# Patient Record
Sex: MALE | Race: BLACK OR AFRICAN AMERICAN | NOT HISPANIC OR LATINO | Employment: OTHER | URBAN - METROPOLITAN AREA
[De-identification: names, ages, dates, MRNs, and addresses within clinical notes are randomized per-mention and may not be internally consistent; named-entity substitution may affect disease eponyms.]

---

## 2021-02-01 ENCOUNTER — IMMUNIZATION (OUTPATIENT)
Dept: VACCINE CLINIC | Facility: HOSPITAL | Age: 81
End: 2021-02-01

## 2021-02-01 PROCEDURE — 0001A: CPT | Performed by: INTERNAL MEDICINE

## 2021-02-01 PROCEDURE — 91300 HC SARSCOV02 VAC 30MCG/0.3ML IM: CPT | Performed by: INTERNAL MEDICINE

## 2021-02-22 ENCOUNTER — IMMUNIZATION (OUTPATIENT)
Dept: VACCINE CLINIC | Facility: HOSPITAL | Age: 81
End: 2021-02-22

## 2021-02-22 PROCEDURE — 0002A: CPT | Performed by: INTERNAL MEDICINE

## 2021-02-22 PROCEDURE — 91300 HC SARSCOV02 VAC 30MCG/0.3ML IM: CPT | Performed by: INTERNAL MEDICINE

## 2022-03-21 ENCOUNTER — TELEPHONE (OUTPATIENT)
Dept: FAMILY MEDICINE CLINIC | Facility: CLINIC | Age: 82
End: 2022-03-21

## 2022-03-23 RX ORDER — SIMVASTATIN 40 MG
TABLET ORAL
Qty: 30 TABLET | Refills: 0 | Status: SHIPPED | OUTPATIENT
Start: 2022-03-23 | End: 2022-04-26

## 2022-03-23 RX ORDER — TAMSULOSIN HYDROCHLORIDE 0.4 MG/1
CAPSULE ORAL
Qty: 90 CAPSULE | Refills: 0 | Status: SHIPPED | OUTPATIENT
Start: 2022-03-23 | End: 2022-07-15 | Stop reason: SDUPTHER

## 2022-04-26 RX ORDER — SIMVASTATIN 40 MG
TABLET ORAL
Qty: 90 TABLET | Refills: 1 | Status: SHIPPED | OUTPATIENT
Start: 2022-04-26 | End: 2022-07-15 | Stop reason: SDUPTHER

## 2022-04-26 NOTE — TELEPHONE ENCOUNTER
Called patient and got him scheduled for an annual with dr. Malone. Was last billed for one in [employer] on 07/13/21 so we should be fine with that date. Sent in medication to get him to appointment

## 2022-05-25 RX ORDER — PANTOPRAZOLE SODIUM 40 MG/1
TABLET, DELAYED RELEASE ORAL
Qty: 30 TABLET | Refills: 2 | Status: SHIPPED | OUTPATIENT
Start: 2022-05-25 | End: 2022-07-15 | Stop reason: SDUPTHER

## 2022-06-09 RX ORDER — POTASSIUM CHLORIDE 1500 MG/1
TABLET, FILM COATED, EXTENDED RELEASE ORAL
Qty: 30 TABLET | Refills: 0 | Status: SHIPPED | OUTPATIENT
Start: 2022-06-09 | End: 2022-07-11

## 2022-06-16 RX ORDER — OXYBUTYNIN CHLORIDE 10 MG/1
TABLET, EXTENDED RELEASE ORAL
Qty: 90 TABLET | Refills: 0 | Status: SHIPPED | OUTPATIENT
Start: 2022-06-16 | End: 2022-07-15 | Stop reason: SDUPTHER

## 2022-06-20 DIAGNOSIS — E11.43 TYPE 2 DIABETES MELLITUS WITH DIABETIC AUTONOMIC NEUROPATHY, WITHOUT LONG-TERM CURRENT USE OF INSULIN: Primary | ICD-10-CM

## 2022-06-21 RX ORDER — GABAPENTIN 100 MG/1
CAPSULE ORAL
Qty: 45 CAPSULE | Refills: 0 | Status: SHIPPED | OUTPATIENT
Start: 2022-06-21 | End: 2022-07-15 | Stop reason: SDUPTHER

## 2022-07-11 RX ORDER — BUMETANIDE 1 MG/1
TABLET ORAL
Qty: 30 TABLET | Refills: 0 | Status: SHIPPED | OUTPATIENT
Start: 2022-07-11 | End: 2022-07-15 | Stop reason: SDUPTHER

## 2022-07-11 RX ORDER — POTASSIUM CHLORIDE 1500 MG/1
TABLET, FILM COATED, EXTENDED RELEASE ORAL
Qty: 30 TABLET | Refills: 0 | Status: SHIPPED | OUTPATIENT
Start: 2022-07-11 | End: 2022-07-15 | Stop reason: SDUPTHER

## 2022-07-15 ENCOUNTER — OFFICE VISIT (OUTPATIENT)
Dept: FAMILY MEDICINE CLINIC | Facility: CLINIC | Age: 82
End: 2022-07-15

## 2022-07-15 VITALS
WEIGHT: 173 LBS | OXYGEN SATURATION: 98 % | DIASTOLIC BLOOD PRESSURE: 80 MMHG | HEART RATE: 67 BPM | SYSTOLIC BLOOD PRESSURE: 160 MMHG

## 2022-07-15 DIAGNOSIS — F39 MOOD DISORDER: ICD-10-CM

## 2022-07-15 DIAGNOSIS — Z00.00 ROUTINE GENERAL MEDICAL EXAMINATION AT A HEALTH CARE FACILITY: Primary | ICD-10-CM

## 2022-07-15 DIAGNOSIS — E11.43 TYPE 2 DIABETES MELLITUS WITH DIABETIC AUTONOMIC NEUROPATHY, WITHOUT LONG-TERM CURRENT USE OF INSULIN: ICD-10-CM

## 2022-07-15 DIAGNOSIS — N32.81 OVERACTIVE BLADDER: ICD-10-CM

## 2022-07-15 DIAGNOSIS — I10 PRIMARY HYPERTENSION: ICD-10-CM

## 2022-07-15 DIAGNOSIS — E78.2 MIXED HYPERLIPIDEMIA: ICD-10-CM

## 2022-07-15 DIAGNOSIS — N40.1 LOWER URINARY TRACT SYMPTOMS DUE TO BENIGN PROSTATIC HYPERPLASIA: ICD-10-CM

## 2022-07-15 PROCEDURE — 36415 COLL VENOUS BLD VENIPUNCTURE: CPT | Performed by: FAMILY MEDICINE

## 2022-07-15 PROCEDURE — 99397 PER PM REEVAL EST PAT 65+ YR: CPT | Performed by: FAMILY MEDICINE

## 2022-07-15 RX ORDER — POTASSIUM CHLORIDE 1500 MG/1
20 TABLET, FILM COATED, EXTENDED RELEASE ORAL DAILY
Qty: 90 TABLET | Refills: 1 | Status: SHIPPED | OUTPATIENT
Start: 2022-07-15 | End: 2023-01-17

## 2022-07-15 RX ORDER — OXYBUTYNIN CHLORIDE 10 MG/1
10 TABLET, EXTENDED RELEASE ORAL DAILY
Qty: 90 TABLET | Refills: 1 | Status: SHIPPED | OUTPATIENT
Start: 2022-07-15 | End: 2023-01-27 | Stop reason: SDUPTHER

## 2022-07-15 RX ORDER — BUMETANIDE 1 MG/1
1 TABLET ORAL DAILY
Qty: 90 TABLET | Refills: 1 | Status: SHIPPED | OUTPATIENT
Start: 2022-07-15 | End: 2023-01-27 | Stop reason: SDUPTHER

## 2022-07-15 RX ORDER — TAMSULOSIN HYDROCHLORIDE 0.4 MG/1
1 CAPSULE ORAL DAILY
Qty: 90 CAPSULE | Refills: 1 | Status: SHIPPED | OUTPATIENT
Start: 2022-07-15 | End: 2023-01-17

## 2022-07-15 RX ORDER — PANTOPRAZOLE SODIUM 40 MG/1
40 TABLET, DELAYED RELEASE ORAL DAILY
Qty: 90 TABLET | Refills: 1 | Status: SHIPPED | OUTPATIENT
Start: 2022-07-15 | End: 2023-01-17

## 2022-07-15 RX ORDER — GABAPENTIN 100 MG/1
100 CAPSULE ORAL 3 TIMES DAILY
Qty: 90 CAPSULE | Refills: 2 | Status: SHIPPED | OUTPATIENT
Start: 2022-07-15 | End: 2023-01-27 | Stop reason: SDUPTHER

## 2022-07-15 RX ORDER — SIMVASTATIN 40 MG
40 TABLET ORAL EVERY EVENING
Qty: 90 TABLET | Refills: 1 | Status: SHIPPED | OUTPATIENT
Start: 2022-07-15 | End: 2023-01-27 | Stop reason: SDUPTHER

## 2022-07-15 NOTE — PROGRESS NOTES
Male Physical Note      Date:  07/15/2022   Patient Name: Carroll Frost  : 1940   MRN: [id number]     Chief Complaint:  No chief complaint on file.      History of Present Illness: Carroll Frost is a 81 y.o. male who is here today for their annual health maintenance and physical.  Overall patient doing very well.  Current conditions are stable.      Subjective      Review of Systems:   Review of Systems   Constitutional: Negative for fatigue and fever.   HENT: Negative for congestion and ear pain.    Respiratory: Negative for apnea, cough, chest tightness and shortness of breath.    Cardiovascular: Negative for chest pain.   Gastrointestinal: Negative for abdominal pain, constipation, diarrhea and nausea.   Musculoskeletal: Negative for arthralgias.   Psychiatric/Behavioral: Negative for depressed mood and stress.       Past Medical History:   Past Medical History:   Diagnosis Date   • Acute midline low back pain without sciatica    • BPH (benign prostatic hyperplasia)    • Chronic bilateral low back pain without sciatica    • Chronic sinusitis     hx chronic sinusitis and nasal polyps   • Class 1 obesity due to excess calories with serious comorbidity and body mass index (BMI) of 30.0 to 30.9 in adult    • Cluster headaches     resolved as he got older   • Essential hypertension    • Frequent falls    • Glaucoma    • H/O oral aphthous ulcers     Recurrent apthous ulcers   • Hemorrhoids     hx of   • History of poliomyelitis    • Hyperlipidemia     meds   • Hypertension     meds   • Insomnia    • Megaloblastic anemia     hx of   • Mild episode of recurrent major depressive disorder (HCC)    • Mixed hyperlipidemia    • Neuropathy    • Obstructive sleep apnea syndrome     CPAP, not successful using so far   • Polio     hx polio   • PUD (peptic ulcer disease)     hx PUD with H pylori - s/p antibiotic tx for H pylori   • Shoulder arthritis        Past Surgical History: History reviewed. No pertinent  surgical history.    Family History:   Family History   Problem Relation Age of Onset   • Asthma Mother    • Hypertension Father        Social History:   Social History     Socioeconomic History   • Marital status:    Tobacco Use   • Smoking status: Never Smoker   • Smokeless tobacco: Never Used   • Tobacco comment: Tobacco/Vaping use       Medications:     Current Outpatient Medications:   •  bumetanide (BUMEX) 1 MG tablet, Take 1 tablet by mouth Daily., Disp: 90 tablet, Rfl: 1  •  gabapentin (NEURONTIN) 100 MG capsule, Take 1 capsule by mouth 3 (Three) Times a Day., Disp: 90 capsule, Rfl: 2  •  metoprolol tartrate (LOPRESSOR) 25 MG tablet, Take 1 tablet by mouth 2 (Two) Times a Day., Disp: 180 tablet, Rfl: 1  •  oxybutynin XL (DITROPAN-XL) 10 MG 24 hr tablet, Take 1 tablet by mouth Daily., Disp: 90 tablet, Rfl: 1  •  pantoprazole (PROTONIX) 40 MG EC tablet, Take 1 tablet by mouth Daily., Disp: 90 tablet, Rfl: 1  •  potassium chloride ER (K-TAB) 20 MEQ tablet controlled-release ER tablet, Take 1 tablet by mouth Daily. with food., Disp: 90 tablet, Rfl: 1  •  simvastatin (ZOCOR) 40 MG tablet, Take 1 tablet by mouth Every Evening., Disp: 90 tablet, Rfl: 1  •  tamsulosin (FLOMAX) 0.4 MG capsule 24 hr capsule, Take 1 capsule by mouth Daily., Disp: 90 capsule, Rfl: 1    Allergies:   No Known Allergies    Immunization History   Administered Date(s) Administered   • COVID-19 (PFIZER) PURPLE CAP 02/01/2021, 02/22/2021, 10/22/2021   • Covid-19 (Pfizer) Gray Cap 06/28/2022   • Influenza, Unspecified 08/28/2021   • Pneumococcal, Unspecified 08/01/2013, 08/29/2015   • Zoster, Unspecified 05/20/2010     Colorectal Screening:     Last Completed Colonoscopy     This patient has no relevant Health Maintenance data.           Diet/Physical activity:.  Diet and physical activity discussed.    Depression: PHQ-2 Depression Screening  Little interest or pleasure in doing things?     Feeling down, depressed, or hopeless?     PHQ-2  Total Score          Objective     Physical Exam:  Vital Signs:   Vitals:    07/15/22 0816   BP: 160/80   Pulse: 67   SpO2: 98%   Weight: 78.5 kg (173 lb)     There is no height or weight on file to calculate BMI.     Physical Exam  Vitals and nursing note reviewed.   Constitutional:       General: He is not in acute distress.     Appearance: Normal appearance. He is not ill-appearing.   HENT:      Head: Normocephalic and atraumatic.      Right Ear: Tympanic membrane and ear canal normal.      Left Ear: Tympanic membrane and ear canal normal.      Nose: Nose normal.   Cardiovascular:      Rate and Rhythm: Normal rate and regular rhythm.      Heart sounds: Normal heart sounds.   Pulmonary:      Effort: Pulmonary effort is normal.      Breath sounds: Normal breath sounds.   Neurological:      Mental Status: He is alert and oriented to person, place, and time. Mental status is at baseline.   Psychiatric:         Mood and Affect: Mood normal.         Procedures    Assessment / Plan      Assessment/Plan:   Diagnoses and all orders for this visit:    1. Routine general medical examination at a health care facility (Primary)    2. Type 2 diabetes mellitus with diabetic autonomic neuropathy, without long-term current use of insulin (HCC)  -     gabapentin (NEURONTIN) 100 MG capsule; Take 1 capsule by mouth 3 (Three) Times a Day.  Dispense: 90 capsule; Refill: 2  -     Hemoglobin A1c; Future  -     Hemoglobin A1c    3. Primary hypertension  -     CBC Auto Differential; Future  -     Comprehensive Metabolic Panel; Future  -     Lipid Panel; Future  -     TSH; Future  -     CBC Auto Differential  -     Comprehensive Metabolic Panel  -     Lipid Panel  -     TSH    4. Mixed hyperlipidemia    5. Overactive bladder    6. Lower urinary tract symptoms due to benign prostatic hyperplasia    7. Mood disorder (HCC)    Other orders  -     bumetanide (BUMEX) 1 MG tablet; Take 1 tablet by mouth Daily.  Dispense: 90 tablet; Refill: 1  -      metoprolol tartrate (LOPRESSOR) 25 MG tablet; Take 1 tablet by mouth 2 (Two) Times a Day.  Dispense: 180 tablet; Refill: 1  -     oxybutynin XL (DITROPAN-XL) 10 MG 24 hr tablet; Take 1 tablet by mouth Daily.  Dispense: 90 tablet; Refill: 1  -     pantoprazole (PROTONIX) 40 MG EC tablet; Take 1 tablet by mouth Daily.  Dispense: 90 tablet; Refill: 1  -     potassium chloride ER (K-TAB) 20 MEQ tablet controlled-release ER tablet; Take 1 tablet by mouth Daily. with food.  Dispense: 90 tablet; Refill: 1  -     simvastatin (ZOCOR) 40 MG tablet; Take 1 tablet by mouth Every Evening.  Dispense: 90 tablet; Refill: 1  -     tamsulosin (FLOMAX) 0.4 MG capsule 24 hr capsule; Take 1 capsule by mouth Daily.  Dispense: 90 capsule; Refill: 1         Continue all regular medications.  Appropriate blood work today.  Refill medication.  No other significant complaints.  Reviewed appropriate health maintenance.      Follow Up:   Return in about 6 months (around 1/15/2023) for Annual Wellness-Nurse, Recheck.    Healthcare Maintenance:   Counseling provided on appropriate diet and exercise.  Carroll Frost voices understanding and acceptance of this advice and will call back with any further questions or concerns. AVS with preventive healthcare tips printed for patient.     Carroll Malone MD  [facility] [place]

## 2022-07-16 LAB
ALBUMIN SERPL-MCNC: 3.4 G/DL (ref 3.6–4.6)
ALBUMIN/GLOB SERPL: 1.2 {RATIO} (ref 1.2–2.2)
ALP SERPL-CCNC: 88 IU/L (ref 44–121)
ALT SERPL-CCNC: 12 IU/L (ref 0–44)
AST SERPL-CCNC: 16 IU/L (ref 0–40)
BASOPHILS # BLD AUTO: 0 X10E3/UL (ref 0–0.2)
BASOPHILS NFR BLD AUTO: 1 %
BILIRUB SERPL-MCNC: 0.2 MG/DL (ref 0–1.2)
BUN SERPL-MCNC: 17 MG/DL (ref 8–27)
BUN/CREAT SERPL: 12 (ref 10–24)
CALCIUM SERPL-MCNC: 9.1 MG/DL (ref 8.6–10.2)
CHLORIDE SERPL-SCNC: 104 MMOL/L (ref 96–106)
CHOLEST SERPL-MCNC: 146 MG/DL (ref 100–199)
CO2 SERPL-SCNC: 21 MMOL/L (ref 20–29)
CREAT SERPL-MCNC: 1.42 MG/DL (ref 0.76–1.27)
EGFRCR SERPLBLD CKD-EPI 2021: 49 ML/MIN/1.73
EOSINOPHIL # BLD AUTO: 0.6 X10E3/UL (ref 0–0.4)
EOSINOPHIL NFR BLD AUTO: 11 %
ERYTHROCYTE [DISTWIDTH] IN BLOOD BY AUTOMATED COUNT: 14.2 % (ref 11.6–15.4)
GLOBULIN SER CALC-MCNC: 2.8 G/DL (ref 1.5–4.5)
GLUCOSE SERPL-MCNC: 88 MG/DL (ref 65–99)
HBA1C MFR BLD: 6.3 % (ref 4.8–5.6)
HCT VFR BLD AUTO: 34.8 % (ref 37.5–51)
HDLC SERPL-MCNC: 52 MG/DL
HGB BLD-MCNC: 11.7 G/DL (ref 13–17.7)
IMM GRANULOCYTES # BLD AUTO: 0 X10E3/UL (ref 0–0.1)
IMM GRANULOCYTES NFR BLD AUTO: 0 %
LDLC SERPL CALC-MCNC: 77 MG/DL (ref 0–99)
LYMPHOCYTES # BLD AUTO: 1.4 X10E3/UL (ref 0.7–3.1)
LYMPHOCYTES NFR BLD AUTO: 25 %
MCH RBC QN AUTO: 29.7 PG (ref 26.6–33)
MCHC RBC AUTO-ENTMCNC: 33.6 G/DL (ref 31.5–35.7)
MCV RBC AUTO: 88 FL (ref 79–97)
MONOCYTES # BLD AUTO: 0.5 X10E3/UL (ref 0.1–0.9)
MONOCYTES NFR BLD AUTO: 9 %
MORPHOLOGY BLD-IMP: ABNORMAL
NEUTROPHILS # BLD AUTO: 3 X10E3/UL (ref 1.4–7)
NEUTROPHILS NFR BLD AUTO: 54 %
PLATELET # BLD AUTO: 100 X10E3/UL (ref 150–450)
POTASSIUM SERPL-SCNC: 4.7 MMOL/L (ref 3.5–5.2)
PROT SERPL-MCNC: 6.2 G/DL (ref 6–8.5)
RBC # BLD AUTO: 3.94 X10E6/UL (ref 4.14–5.8)
SODIUM SERPL-SCNC: 139 MMOL/L (ref 134–144)
TRIGL SERPL-MCNC: 89 MG/DL (ref 0–149)
TSH SERPL DL<=0.005 MIU/L-ACNC: 2.98 UIU/ML (ref 0.45–4.5)
VLDLC SERPL CALC-MCNC: 17 MG/DL (ref 5–40)
WBC # BLD AUTO: 5.5 X10E3/UL (ref 3.4–10.8)

## 2022-07-18 RX ORDER — NABUMETONE 750 MG/1
TABLET, FILM COATED ORAL
Qty: 60 TABLET | Refills: 2 | Status: SHIPPED | OUTPATIENT
Start: 2022-07-18 | End: 2023-01-17

## 2022-07-18 RX ORDER — NABUMETONE 750 MG/1
TABLET, FILM COATED ORAL
Qty: 60 TABLET | Refills: 2 | Status: SHIPPED | OUTPATIENT
Start: 2022-07-18 | End: 2022-07-18

## 2022-08-15 ENCOUNTER — OFFICE VISIT (OUTPATIENT)
Dept: FAMILY MEDICINE CLINIC | Facility: CLINIC | Age: 82
End: 2022-08-15

## 2022-08-15 VITALS
SYSTOLIC BLOOD PRESSURE: 156 MMHG | OXYGEN SATURATION: 97 % | WEIGHT: 197 LBS | HEIGHT: 65 IN | BODY MASS INDEX: 32.82 KG/M2 | DIASTOLIC BLOOD PRESSURE: 80 MMHG | HEART RATE: 81 BPM

## 2022-08-15 DIAGNOSIS — L72.3 SEBACEOUS CYST: Primary | ICD-10-CM

## 2022-08-15 DIAGNOSIS — G47.33 OBSTRUCTIVE SLEEP APNEA: ICD-10-CM

## 2022-08-15 PROCEDURE — 99212 OFFICE O/P EST SF 10 MIN: CPT | Performed by: FAMILY MEDICINE

## 2022-08-15 NOTE — PROGRESS NOTES
Follow Up Office Visit      Date of Visit:  08/15/2022   Patient Name: Carroll Frost  : 1940   MRN: [id number]     Chief Complaint:    Chief Complaint   Patient presents with   • skin lesion on neck       History of Present Illness: Carroll Frost is a 81 y.o. male who is here today for follow up.  Patient comes in today for evaluation of a skin lesion on his neck.  Seems to have gotten a little bit larger.  No discomfort noted.        Subjective      Review of Systems:   Review of Systems    Past Medical History:   Past Medical History:   Diagnosis Date   • Acute midline low back pain without sciatica    • BPH (benign prostatic hyperplasia)    • Chronic bilateral low back pain without sciatica    • Chronic sinusitis     hx chronic sinusitis and nasal polyps   • Class 1 obesity due to excess calories with serious comorbidity and body mass index (BMI) of 30.0 to 30.9 in adult    • Cluster headaches     resolved as he got older   • Essential hypertension    • Frequent falls    • Glaucoma    • H/O oral aphthous ulcers     Recurrent apthous ulcers   • Hemorrhoids     hx of   • History of poliomyelitis    • Hyperlipidemia     meds   • Hypertension     meds   • Insomnia    • Megaloblastic anemia     hx of   • Mild episode of recurrent major depressive disorder (HCC)    • Mixed hyperlipidemia    • Neuropathy    • Obstructive sleep apnea syndrome     CPAP, not successful using so far   • Polio     hx polio   • PUD (peptic ulcer disease)     hx PUD with H pylori - s/p antibiotic tx for H pylori   • Shoulder arthritis        Past Surgical History: No past surgical history on file.    Family History:   Family History   Problem Relation Age of Onset   • Asthma Mother    • Hypertension Father        Social History:   Social History     Socioeconomic History   • Marital status:    Tobacco Use   • Smoking status: Never Smoker   • Smokeless tobacco: Never Used   • Tobacco comment: Tobacco/Vaping use  "      Medications:     Current Outpatient Medications:   •  bumetanide (BUMEX) 1 MG tablet, Take 1 tablet by mouth Daily., Disp: 90 tablet, Rfl: 1  •  gabapentin (NEURONTIN) 100 MG capsule, Take 1 capsule by mouth 3 (Three) Times a Day., Disp: 90 capsule, Rfl: 2  •  metoprolol tartrate (LOPRESSOR) 25 MG tablet, Take 1 tablet by mouth 2 (Two) Times a Day., Disp: 180 tablet, Rfl: 1  •  nabumetone (RELAFEN) 750 MG tablet, TAKE ONE TABLET BY MOUTH TWICE A DAY, Disp: 60 tablet, Rfl: 2  •  oxybutynin XL (DITROPAN-XL) 10 MG 24 hr tablet, Take 1 tablet by mouth Daily., Disp: 90 tablet, Rfl: 1  •  pantoprazole (PROTONIX) 40 MG EC tablet, Take 1 tablet by mouth Daily., Disp: 90 tablet, Rfl: 1  •  potassium chloride ER (K-TAB) 20 MEQ tablet controlled-release ER tablet, Take 1 tablet by mouth Daily. with food., Disp: 90 tablet, Rfl: 1  •  simvastatin (ZOCOR) 40 MG tablet, Take 1 tablet by mouth Every Evening., Disp: 90 tablet, Rfl: 1  •  tamsulosin (FLOMAX) 0.4 MG capsule 24 hr capsule, Take 1 capsule by mouth Daily., Disp: 90 capsule, Rfl: 1    Allergies:   No Known Allergies    Objective     Physical Exam:  Vital Signs:   Vitals:    08/15/22 0838   BP: 156/80   Pulse: 81   SpO2: 97%   Weight: 89.4 kg (197 lb)   Height: 165.1 cm (65\")     Body mass index is 32.78 kg/m².     Physical Exam  Vitals and nursing note reviewed.   Constitutional:       General: He is not in acute distress.     Appearance: Normal appearance. He is not ill-appearing.   HENT:      Head: Normocephalic and atraumatic.      Right Ear: Tympanic membrane and ear canal normal.      Left Ear: Tympanic membrane and ear canal normal.      Nose: Nose normal.   Cardiovascular:      Rate and Rhythm: Normal rate and regular rhythm.      Heart sounds: Normal heart sounds.   Pulmonary:      Effort: Pulmonary effort is normal.      Breath sounds: Normal breath sounds.   Skin:     Comments: Sebaceous cyst on the neck   Neurological:      Mental Status: He is alert and " oriented to person, place, and time. Mental status is at baseline.   Psychiatric:         Mood and Affect: Mood normal.         Procedures      Assessment / Plan      Assessment/Plan:   Diagnoses and all orders for this visit:    1. Sebaceous cyst (Primary)         Very small.  Patient currently not interested in removing it.  He is going to make sure that was.    Follow Up:   No follow-ups on file.    Carroll Malone  [facility] [place]

## 2022-12-06 ENCOUNTER — TELEPHONE (OUTPATIENT)
Dept: FAMILY MEDICINE CLINIC | Facility: CLINIC | Age: 82
End: 2022-12-06

## 2022-12-06 NOTE — TELEPHONE ENCOUNTER
Pt came in to state he is needing clearance from  to do his sleep study at home to evaluate his sleep apnea, does he need to make an appointment?

## 2022-12-08 NOTE — TELEPHONE ENCOUNTER
I am not sure I understand the message.  I am not sure what were talking about in regards to clearance?.  He may need some type of an order to do a sleep test.  If that is what it is, we have some preprinted orders I think to do those.  Please find out exactly what he needs.

## 2022-12-08 NOTE — TELEPHONE ENCOUNTER
Im sorry, he does just need an order for one I thought he was stating he needed to be evaluated before hand. He will be by this afternoon to pick it up

## 2022-12-19 ENCOUNTER — TELEPHONE (OUTPATIENT)
Dept: FAMILY MEDICINE CLINIC | Facility: CLINIC | Age: 82
End: 2022-12-19

## 2022-12-19 NOTE — TELEPHONE ENCOUNTER
Contacted Pt to ask about Wellness visit/schedule appt. Pt asked if we had sent in a request for an updated sleep study. Pt states that his current CPAP is not working, and they will replace it next month but he has to have an updates study done. Prefers home study. Please return PT call regarding study.

## 2023-01-16 ENCOUNTER — TELEPHONE (OUTPATIENT)
Dept: FAMILY MEDICINE CLINIC | Facility: CLINIC | Age: 83
End: 2023-01-16
Payer: MEDICARE

## 2023-01-16 NOTE — TELEPHONE ENCOUNTER
PATIENT HAS CALLED REQUESTING A CALL BACK ASAP WITH RESULTS OF CPAP TEST.    CALL BACK NUMBER [phone]

## 2023-01-16 NOTE — TELEPHONE ENCOUNTER
Pt contacted--Faisal said patient does have moderate sleep apnea and he signed orders yesterday for him to get a c pap machine.

## 2023-01-17 RX ORDER — PANTOPRAZOLE SODIUM 40 MG/1
TABLET, DELAYED RELEASE ORAL
Qty: 30 TABLET | Refills: 2 | Status: SHIPPED | OUTPATIENT
Start: 2023-01-17 | End: 2023-01-27 | Stop reason: SDUPTHER

## 2023-01-17 RX ORDER — POTASSIUM CHLORIDE 1500 MG/1
TABLET, FILM COATED, EXTENDED RELEASE ORAL
Qty: 30 TABLET | Refills: 2 | Status: SHIPPED | OUTPATIENT
Start: 2023-01-17 | End: 2023-01-27 | Stop reason: SDUPTHER

## 2023-01-17 RX ORDER — PANTOPRAZOLE SODIUM 40 MG/1
TABLET, DELAYED RELEASE ORAL
Qty: 90 TABLET | Refills: 1 | OUTPATIENT
Start: 2023-01-17

## 2023-01-17 RX ORDER — TAMSULOSIN HYDROCHLORIDE 0.4 MG/1
CAPSULE ORAL
Qty: 90 CAPSULE | Refills: 1 | Status: SHIPPED | OUTPATIENT
Start: 2023-01-17

## 2023-01-17 RX ORDER — NABUMETONE 750 MG/1
TABLET, FILM COATED ORAL
Qty: 60 TABLET | Refills: 2 | Status: SHIPPED | OUTPATIENT
Start: 2023-01-17 | End: 2023-01-27 | Stop reason: SDUPTHER

## 2023-01-17 RX ORDER — NABUMETONE 750 MG/1
TABLET, FILM COATED ORAL
Qty: 60 TABLET | Refills: 2 | OUTPATIENT
Start: 2023-01-17

## 2023-01-27 ENCOUNTER — OFFICE VISIT (OUTPATIENT)
Dept: FAMILY MEDICINE CLINIC | Facility: CLINIC | Age: 83
End: 2023-01-27
Payer: MEDICARE

## 2023-01-27 VITALS
HEART RATE: 57 BPM | WEIGHT: 198 LBS | BODY MASS INDEX: 32.99 KG/M2 | DIASTOLIC BLOOD PRESSURE: 80 MMHG | SYSTOLIC BLOOD PRESSURE: 144 MMHG | HEIGHT: 65 IN

## 2023-01-27 DIAGNOSIS — I10 PRIMARY HYPERTENSION: ICD-10-CM

## 2023-01-27 DIAGNOSIS — Z00.00 WELLNESS EXAMINATION: Primary | ICD-10-CM

## 2023-01-27 DIAGNOSIS — E11.43 TYPE 2 DIABETES MELLITUS WITH DIABETIC AUTONOMIC NEUROPATHY, WITHOUT LONG-TERM CURRENT USE OF INSULIN: ICD-10-CM

## 2023-01-27 DIAGNOSIS — N32.81 OVERACTIVE BLADDER: ICD-10-CM

## 2023-01-27 DIAGNOSIS — E78.2 MIXED HYPERLIPIDEMIA: ICD-10-CM

## 2023-01-27 DIAGNOSIS — G47.33 OBSTRUCTIVE SLEEP APNEA: ICD-10-CM

## 2023-01-27 PROCEDURE — 36415 COLL VENOUS BLD VENIPUNCTURE: CPT | Performed by: FAMILY MEDICINE

## 2023-01-27 PROCEDURE — 96160 PT-FOCUSED HLTH RISK ASSMT: CPT | Performed by: FAMILY MEDICINE

## 2023-01-27 PROCEDURE — 1159F MED LIST DOCD IN RCRD: CPT | Performed by: FAMILY MEDICINE

## 2023-01-27 PROCEDURE — 1160F RVW MEDS BY RX/DR IN RCRD: CPT | Performed by: FAMILY MEDICINE

## 2023-01-27 PROCEDURE — G0439 PPPS, SUBSEQ VISIT: HCPCS | Performed by: FAMILY MEDICINE

## 2023-01-27 PROCEDURE — 1170F FXNL STATUS ASSESSED: CPT | Performed by: FAMILY MEDICINE

## 2023-01-27 RX ORDER — BUMETANIDE 1 MG/1
1 TABLET ORAL DAILY
Qty: 90 TABLET | Refills: 1 | Status: SHIPPED | OUTPATIENT
Start: 2023-01-27

## 2023-01-27 RX ORDER — PANTOPRAZOLE SODIUM 40 MG/1
40 TABLET, DELAYED RELEASE ORAL DAILY
Qty: 90 TABLET | Refills: 1 | Status: SHIPPED | OUTPATIENT
Start: 2023-01-27

## 2023-01-27 RX ORDER — POTASSIUM CHLORIDE 1500 MG/1
20 TABLET, FILM COATED, EXTENDED RELEASE ORAL DAILY
Qty: 90 TABLET | Refills: 1 | Status: SHIPPED | OUTPATIENT
Start: 2023-01-27

## 2023-01-27 RX ORDER — SIMVASTATIN 40 MG
40 TABLET ORAL EVERY EVENING
Qty: 90 TABLET | Refills: 1 | Status: SHIPPED | OUTPATIENT
Start: 2023-01-27

## 2023-01-27 RX ORDER — NABUMETONE 750 MG/1
750 TABLET, FILM COATED ORAL 2 TIMES DAILY
Qty: 180 TABLET | Refills: 1 | Status: SHIPPED | OUTPATIENT
Start: 2023-01-27

## 2023-01-27 RX ORDER — GABAPENTIN 100 MG/1
100 CAPSULE ORAL 3 TIMES DAILY
Qty: 90 CAPSULE | Refills: 2 | Status: SHIPPED | OUTPATIENT
Start: 2023-01-27

## 2023-01-27 RX ORDER — OXYBUTYNIN CHLORIDE 10 MG/1
10 TABLET, EXTENDED RELEASE ORAL DAILY
Qty: 90 TABLET | Refills: 1 | Status: SHIPPED | OUTPATIENT
Start: 2023-01-27

## 2023-01-28 LAB
ALBUMIN SERPL-MCNC: 3.7 G/DL (ref 3.6–4.6)
ALBUMIN/GLOB SERPL: 1.6 {RATIO} (ref 1.2–2.2)
ALP SERPL-CCNC: 76 IU/L (ref 44–121)
ALT SERPL-CCNC: 13 IU/L (ref 0–44)
AST SERPL-CCNC: 18 IU/L (ref 0–40)
BASOPHILS # BLD AUTO: 0 X10E3/UL (ref 0–0.2)
BASOPHILS NFR BLD AUTO: 0 %
BILIRUB SERPL-MCNC: 0.3 MG/DL (ref 0–1.2)
BUN SERPL-MCNC: 13 MG/DL (ref 8–27)
BUN/CREAT SERPL: 10 (ref 10–24)
CALCIUM SERPL-MCNC: 8.8 MG/DL (ref 8.6–10.2)
CHLORIDE SERPL-SCNC: 104 MMOL/L (ref 96–106)
CHOLEST SERPL-MCNC: 150 MG/DL (ref 100–199)
CO2 SERPL-SCNC: 25 MMOL/L (ref 20–29)
CREAT SERPL-MCNC: 1.31 MG/DL (ref 0.76–1.27)
EGFRCR SERPLBLD CKD-EPI 2021: 54 ML/MIN/1.73
EOSINOPHIL # BLD AUTO: 0.4 X10E3/UL (ref 0–0.4)
EOSINOPHIL NFR BLD AUTO: 9 %
ERYTHROCYTE [DISTWIDTH] IN BLOOD BY AUTOMATED COUNT: 14.3 % (ref 11.6–15.4)
GLOBULIN SER CALC-MCNC: 2.3 G/DL (ref 1.5–4.5)
GLUCOSE SERPL-MCNC: 82 MG/DL (ref 70–99)
HBA1C MFR BLD: 5.7 % (ref 4.8–5.6)
HCT VFR BLD AUTO: 37.1 % (ref 37.5–51)
HDLC SERPL-MCNC: 59 MG/DL
HGB BLD-MCNC: 12.5 G/DL (ref 13–17.7)
IMM GRANULOCYTES # BLD AUTO: 0 X10E3/UL (ref 0–0.1)
IMM GRANULOCYTES NFR BLD AUTO: 0 %
LDLC SERPL CALC-MCNC: 75 MG/DL (ref 0–99)
LYMPHOCYTES # BLD AUTO: 1 X10E3/UL (ref 0.7–3.1)
LYMPHOCYTES NFR BLD AUTO: 21 %
MCH RBC QN AUTO: 29.8 PG (ref 26.6–33)
MCHC RBC AUTO-ENTMCNC: 33.7 G/DL (ref 31.5–35.7)
MCV RBC AUTO: 89 FL (ref 79–97)
MONOCYTES # BLD AUTO: 0.5 X10E3/UL (ref 0.1–0.9)
MONOCYTES NFR BLD AUTO: 10 %
MORPHOLOGY BLD-IMP: ABNORMAL
NEUTROPHILS # BLD AUTO: 2.9 X10E3/UL (ref 1.4–7)
NEUTROPHILS NFR BLD AUTO: 60 %
PLATELET # BLD AUTO: 118 X10E3/UL (ref 150–450)
POTASSIUM SERPL-SCNC: 4.6 MMOL/L (ref 3.5–5.2)
PROT SERPL-MCNC: 6 G/DL (ref 6–8.5)
RBC # BLD AUTO: 4.19 X10E6/UL (ref 4.14–5.8)
SODIUM SERPL-SCNC: 137 MMOL/L (ref 134–144)
TRIGL SERPL-MCNC: 82 MG/DL (ref 0–149)
TSH SERPL DL<=0.005 MIU/L-ACNC: 2.12 UIU/ML (ref 0.45–4.5)
VLDLC SERPL CALC-MCNC: 16 MG/DL (ref 5–40)
WBC # BLD AUTO: 4.9 X10E3/UL (ref 3.4–10.8)

## 2023-02-02 ENCOUNTER — TELEPHONE (OUTPATIENT)
Dept: FAMILY MEDICINE CLINIC | Facility: CLINIC | Age: 83
End: 2023-02-02
Payer: MEDICARE

## 2023-02-02 NOTE — TELEPHONE ENCOUNTER
Caller: Carroll Frost    Relationship: Self    Best call back number: [phone]    What is the best time to reach you: ANYTIME     Who are you requesting to speak with (clinical staff, provider,  specific staff member): CLINICAL     What was the call regarding: PATIENT WAS WONDERING WHERE THE C PAP MACHINE WAS, HE SPOKE TO SOMEONE ABOUT 3 WEEKS AGO AND HE WAS SUPPOSED TO GET A ORDER FOR THE MACHINE. PLEASE REACH OUT AND ADVISE.     Do you require a callback: YES

## 2023-02-07 NOTE — TELEPHONE ENCOUNTER
Called patient and he hasn't heard anything about the supplies yet. I re-faxed everything over to [facility]. Told patient if he doesn't hear anything over the next couple weeks, to give us a call.

## 2023-02-13 ENCOUNTER — TELEPHONE (OUTPATIENT)
Dept: FAMILY MEDICINE CLINIC | Facility: CLINIC | Age: 83
End: 2023-02-13
Payer: MEDICARE

## 2023-02-13 NOTE — TELEPHONE ENCOUNTER
[facility] called - said that they need documentation that Pt had sleep issues/a sleep study. Would either need to be an addendum to a previous visit, or a new visit prior to fulfilling the request for a CPAP. Please advise [phone]

## 2023-02-15 NOTE — TELEPHONE ENCOUNTER
Xiomara talked to [employer] and they don't supply the equipment. So we will need you to addend note so we can send to [facility].

## 2023-02-28 ENCOUNTER — TELEPHONE (OUTPATIENT)
Dept: FAMILY MEDICINE CLINIC | Facility: CLINIC | Age: 83
End: 2023-02-28
Payer: MEDICARE

## 2023-03-08 ENCOUNTER — TELEPHONE (OUTPATIENT)
Dept: FAMILY MEDICINE CLINIC | Facility: CLINIC | Age: 83
End: 2023-03-08

## 2023-03-08 ENCOUNTER — OFFICE VISIT (OUTPATIENT)
Dept: FAMILY MEDICINE CLINIC | Facility: CLINIC | Age: 83
End: 2023-03-08
Payer: MEDICARE

## 2023-03-08 VITALS
HEIGHT: 65 IN | HEART RATE: 60 BPM | OXYGEN SATURATION: 98 % | SYSTOLIC BLOOD PRESSURE: 140 MMHG | DIASTOLIC BLOOD PRESSURE: 80 MMHG | WEIGHT: 201 LBS | BODY MASS INDEX: 33.49 KG/M2

## 2023-03-08 DIAGNOSIS — R13.10 DYSPHAGIA, UNSPECIFIED TYPE: Primary | ICD-10-CM

## 2023-03-08 DIAGNOSIS — M18.11 OSTEOARTHRITIS OF RIGHT THUMB: ICD-10-CM

## 2023-03-08 PROCEDURE — 99213 OFFICE O/P EST LOW 20 MIN: CPT | Performed by: FAMILY MEDICINE

## 2023-03-08 NOTE — PROGRESS NOTES
Follow Up Office Visit      Date of Visit:  2023   Patient Name: Carroll Frost  : 1940   MRN: [id number]     Chief Complaint:    Chief Complaint   Patient presents with   • Right thumb pain   • trouble swallowing       History of Present Illness: Carroll Frost is a 82 y.o. male who is here today for follow up.  Patient seen today for some mild dysphagia with foods.  Does have some reflux and occasionally takes his Protonix.  Patient also has some arthritis in his thumb he wanted to discuss today.  Bothering him a little bit more.        Subjective      Review of Systems:   Review of Systems   Constitutional: Negative for fatigue and fever.   HENT: Negative for congestion and ear pain.    Respiratory: Negative for apnea, cough, chest tightness and shortness of breath.    Cardiovascular: Negative for chest pain.   Gastrointestinal: Negative for abdominal pain, constipation, diarrhea and nausea.   Musculoskeletal: Positive for arthralgias.   Psychiatric/Behavioral: Negative for depressed mood and stress.       Past Medical History:   Past Medical History:   Diagnosis Date   • Acute midline low back pain without sciatica    • BPH (benign prostatic hyperplasia)    • Chronic bilateral low back pain without sciatica    • Chronic sinusitis     hx chronic sinusitis and nasal polyps   • Class 1 obesity due to excess calories with serious comorbidity and body mass index (BMI) of 30.0 to 30.9 in adult    • Cluster headaches     resolved as he got older   • Essential hypertension    • Frequent falls    • Glaucoma    • H/O oral aphthous ulcers     Recurrent apthous ulcers   • Hemorrhoids     hx of   • History of poliomyelitis    • Hyperlipidemia     meds   • Hypertension     meds   • Insomnia    • Megaloblastic anemia     hx of   • Mild episode of recurrent major depressive disorder (HCC)    • Mixed hyperlipidemia    • Neuropathy    • Obstructive sleep apnea syndrome     CPAP, not successful using so far   •  "Polio     hx polio   • PUD (peptic ulcer disease)     hx PUD with H pylori - s/p antibiotic tx for H pylori   • Shoulder arthritis        Past Surgical History: No past surgical history on file.    Family History:   Family History   Problem Relation Age of Onset   • Asthma Mother    • Hypertension Father        Social History:   Social History     Socioeconomic History   • Marital status:    Tobacco Use   • Smoking status: Never   • Smokeless tobacco: Never   • Tobacco comments:     Tobacco/Vaping use       Medications:     Current Outpatient Medications:   •  bumetanide (BUMEX) 1 MG tablet, Take 1 tablet by mouth Daily., Disp: 90 tablet, Rfl: 1  •  gabapentin (NEURONTIN) 100 MG capsule, Take 1 capsule by mouth 3 (Three) Times a Day., Disp: 90 capsule, Rfl: 2  •  metoprolol tartrate (LOPRESSOR) 25 MG tablet, Take 1 tablet by mouth 2 (Two) Times a Day., Disp: 180 tablet, Rfl: 1  •  nabumetone (RELAFEN) 750 MG tablet, Take 1 tablet by mouth 2 (Two) Times a Day., Disp: 180 tablet, Rfl: 1  •  oxybutynin XL (DITROPAN-XL) 10 MG 24 hr tablet, Take 1 tablet by mouth Daily., Disp: 90 tablet, Rfl: 1  •  pantoprazole (PROTONIX) 40 MG EC tablet, Take 1 tablet by mouth Daily., Disp: 90 tablet, Rfl: 1  •  potassium chloride ER (K-TAB) 20 MEQ tablet controlled-release ER tablet, Take 1 tablet by mouth Daily. with food., Disp: 90 tablet, Rfl: 1  •  simvastatin (ZOCOR) 40 MG tablet, Take 1 tablet by mouth Every Evening., Disp: 90 tablet, Rfl: 1  •  tamsulosin (FLOMAX) 0.4 MG capsule 24 hr capsule, TAKE ONE CAPSULE BY MOUTH DAILY, Disp: 90 capsule, Rfl: 1    Allergies:   No Known Allergies    Objective     Physical Exam:  Vital Signs:   Vitals:    03/08/23 0931   BP: 140/80   Pulse: 60   SpO2: 98%   Weight: 91.2 kg (201 lb)   Height: 165.1 cm (65\")     Body mass index is 33.45 kg/m².     Physical Exam  Vitals and nursing note reviewed.   Constitutional:       General: He is not in acute distress.     Appearance: Normal " appearance. He is not ill-appearing.   HENT:      Head: Normocephalic and atraumatic.      Right Ear: Tympanic membrane and ear canal normal.      Left Ear: Tympanic membrane and ear canal normal.      Nose: Nose normal.   Cardiovascular:      Rate and Rhythm: Normal rate and regular rhythm.      Heart sounds: Normal heart sounds.   Pulmonary:      Effort: Pulmonary effort is normal.      Breath sounds: Normal breath sounds.   Neurological:      Mental Status: He is alert and oriented to person, place, and time. Mental status is at baseline.   Psychiatric:         Mood and Affect: Mood normal.         Procedures      Assessment / Plan      Assessment/Plan:   Diagnoses and all orders for this visit:    1. Dysphagia, unspecified type (Primary)    2. Osteoarthritis of right thumb         Start taking the Protonix more faithfully.  Watch foods that are more acidic in nature.  We may need to make an appointment with GI but he will see if this helps first.  He will also try diclofenac gel to put on his hand.    Follow Up:   No follow-ups on file.    Carroll Malone  [facility] [place] [facility] [place]

## 2023-03-08 NOTE — TELEPHONE ENCOUNTER
Caller: Carroll Frost    Relationship: Self    Best call back number: [phone]    Caller requesting test results: PATIENT    What test was performed: SLEEP STUDY    When was the test performed: February?    Where was the test performed: PATIENTS HOME    Additional notes: PLEASE ADVISE, PATIENT FORGOT TO ASK ABOUT RESULTS AT TODAYS VISIT

## 2023-04-06 NOTE — TELEPHONE ENCOUNTER
Caller: Carroll Frost     Relationship: Self     Best call back number: [phone]     Caller requesting test results: PATIENT     What test was performed: SLEEP STUDY     When was the test performed: February?     Where was the test performed: PATIENTS HOME     Additional notes: PATIENT HAS BEEN WAITING FOR A MONTH FOR RESULTS OF SLEEP STUDY TEST AND CPAP RESULTS. PLEASE CALL WITH INFO

## 2023-04-20 ENCOUNTER — TELEPHONE (OUTPATIENT)
Dept: FAMILY MEDICINE CLINIC | Facility: CLINIC | Age: 83
End: 2023-04-20
Payer: MEDICARE

## 2023-05-01 ENCOUNTER — TELEPHONE (OUTPATIENT)
Dept: FAMILY MEDICINE CLINIC | Facility: CLINIC | Age: 83
End: 2023-05-01

## 2023-05-01 NOTE — TELEPHONE ENCOUNTER
Caller: EUNICE WITH [employer]    Relationship: Provider    Best call back number: [phone]    What is the best time to reach you: ANY    Who are you requesting to speak with (clinical staff, provider,  specific staff member): CLINICAL    What was the call regarding: NEEDS CHART NOTES DATED LAST 6 MONTHS  FAXED TO FAX: [phone]. THIS IS FOR RENEWAL OF C-PAP     Do you require a callback: NO, JUST FAX TO VIKI DAWSON.

## 2023-05-16 ENCOUNTER — OFFICE VISIT (OUTPATIENT)
Dept: FAMILY MEDICINE CLINIC | Facility: CLINIC | Age: 83
End: 2023-05-16
Payer: MEDICARE

## 2023-05-16 VITALS
HEIGHT: 65 IN | BODY MASS INDEX: 33.32 KG/M2 | OXYGEN SATURATION: 99 % | WEIGHT: 200 LBS | DIASTOLIC BLOOD PRESSURE: 80 MMHG | SYSTOLIC BLOOD PRESSURE: 140 MMHG | HEART RATE: 67 BPM

## 2023-05-16 DIAGNOSIS — G56.01 CARPAL TUNNEL SYNDROME OF RIGHT WRIST: ICD-10-CM

## 2023-05-16 DIAGNOSIS — M18.11 OSTEOARTHRITIS OF RIGHT THUMB: Primary | ICD-10-CM

## 2023-05-16 PROCEDURE — 99214 OFFICE O/P EST MOD 30 MIN: CPT | Performed by: FAMILY MEDICINE

## 2023-05-16 RX ORDER — PANTOPRAZOLE SODIUM 40 MG/1
40 TABLET, DELAYED RELEASE ORAL 2 TIMES DAILY
Qty: 180 TABLET | Refills: 1 | Status: SHIPPED | OUTPATIENT
Start: 2023-05-16

## 2023-05-16 NOTE — PROGRESS NOTES
Follow Up Office Visit      Date of Visit:  2023   Patient Name: Carroll Frost  : 1940   MRN: [id number]     Chief Complaint:    Chief Complaint   Patient presents with   • Osteoarthritis     Right Hand       History of Present Illness: Carroll Frost is a 82 y.o. male who is here today for follow up.  Patient comes in to discuss some pain in his right hand.  He has some numbness through his thumb and first 2 fingers.  Mild weakness.  Known carpal tunnel syndrome.  Already taking some anti-inflammatory medication.  Discussed treatment options of surgery versus injection today.        Subjective      Review of Systems:   Review of Systems   Constitutional: Negative for fatigue and fever.   HENT: Negative for congestion and ear pain.    Respiratory: Negative for apnea, cough, chest tightness and shortness of breath.    Cardiovascular: Negative for chest pain.   Gastrointestinal: Negative for abdominal pain, constipation, diarrhea and nausea.   Musculoskeletal: Negative for arthralgias.   Psychiatric/Behavioral: Negative for depressed mood and stress.       Past Medical History:   Past Medical History:   Diagnosis Date   • Acute midline low back pain without sciatica    • BPH (benign prostatic hyperplasia)    • Chronic bilateral low back pain without sciatica    • Chronic sinusitis     hx chronic sinusitis and nasal polyps   • Class 1 obesity due to excess calories with serious comorbidity and body mass index (BMI) of 30.0 to 30.9 in adult    • Cluster headaches     resolved as he got older   • Essential hypertension    • Frequent falls    • Glaucoma    • H/O oral aphthous ulcers     Recurrent apthous ulcers   • Hemorrhoids     hx of   • History of poliomyelitis    • Hyperlipidemia     meds   • Hypertension     meds   • Insomnia    • Megaloblastic anemia     hx of   • Mild episode of recurrent major depressive disorder    • Mixed hyperlipidemia    • Neuropathy    • Obstructive sleep apnea syndrome   "   CPAP, not successful using so far   • Polio     hx polio   • PUD (peptic ulcer disease)     hx PUD with H pylori - s/p antibiotic tx for H pylori   • Shoulder arthritis        Past Surgical History: No past surgical history on file.    Family History:   Family History   Problem Relation Age of Onset   • Asthma Mother    • Hypertension Father        Social History:   Social History     Socioeconomic History   • Marital status:    Tobacco Use   • Smoking status: Never   • Smokeless tobacco: Never   • Tobacco comments:     Tobacco/Vaping use       Medications:     Current Outpatient Medications:   •  pantoprazole (PROTONIX) 40 MG EC tablet, Take 1 tablet by mouth 2 (Two) Times a Day., Disp: 180 tablet, Rfl: 1  •  bumetanide (BUMEX) 1 MG tablet, Take 1 tablet by mouth Daily., Disp: 90 tablet, Rfl: 1  •  gabapentin (NEURONTIN) 100 MG capsule, Take 1 capsule by mouth 3 (Three) Times a Day., Disp: 90 capsule, Rfl: 2  •  metoprolol tartrate (LOPRESSOR) 25 MG tablet, Take 1 tablet by mouth 2 (Two) Times a Day., Disp: 180 tablet, Rfl: 1  •  nabumetone (RELAFEN) 750 MG tablet, Take 1 tablet by mouth 2 (Two) Times a Day., Disp: 180 tablet, Rfl: 1  •  oxybutynin XL (DITROPAN-XL) 10 MG 24 hr tablet, Take 1 tablet by mouth Daily., Disp: 90 tablet, Rfl: 1  •  potassium chloride ER (K-TAB) 20 MEQ tablet controlled-release ER tablet, Take 1 tablet by mouth Daily. with food., Disp: 90 tablet, Rfl: 1  •  simvastatin (ZOCOR) 40 MG tablet, Take 1 tablet by mouth Every Evening., Disp: 90 tablet, Rfl: 1  •  tamsulosin (FLOMAX) 0.4 MG capsule 24 hr capsule, TAKE ONE CAPSULE BY MOUTH DAILY, Disp: 90 capsule, Rfl: 1    Allergies:   No Known Allergies    Objective     Physical Exam:  Vital Signs:   Vitals:    05/16/23 1444   BP: 140/80   Pulse: 67   SpO2: 99%   Weight: 90.7 kg (200 lb)   Height: 165.1 cm (65\")     Body mass index is 33.28 kg/m².     Physical Exam  Vitals and nursing note reviewed.   Constitutional:       General: He " is not in acute distress.     Appearance: Normal appearance. He is not ill-appearing.   HENT:      Head: Normocephalic and atraumatic.      Right Ear: Tympanic membrane and ear canal normal.      Left Ear: Tympanic membrane and ear canal normal.      Nose: Nose normal.   Cardiovascular:      Rate and Rhythm: Normal rate and regular rhythm.      Heart sounds: Normal heart sounds.   Pulmonary:      Effort: Pulmonary effort is normal.      Breath sounds: Normal breath sounds.   Neurological:      Mental Status: He is alert and oriented to person, place, and time. Mental status is at baseline.   Psychiatric:         Mood and Affect: Mood normal.         Procedures      Assessment / Plan      Assessment/Plan:   Diagnoses and all orders for this visit:    1. Osteoarthritis of right thumb (Primary)    2. Carpal tunnel syndrome of right wrist    Other orders  -     pantoprazole (PROTONIX) 40 MG EC tablet; Take 1 tablet by mouth 2 (Two) Times a Day.  Dispense: 180 tablet; Refill: 1         Continue his anti-inflammatory medication.  Currently declines injection or surgical options.  He can splint his hand if he wants to as well.    Follow Up:   No follow-ups on file.    Carroll Malone  [facility] [place] [facility] [place]

## 2023-07-31 RX ORDER — OXYBUTYNIN CHLORIDE 10 MG/1
TABLET, EXTENDED RELEASE ORAL
Qty: 90 TABLET | Refills: 0 | Status: SHIPPED | OUTPATIENT
Start: 2023-07-31

## 2023-07-31 RX ORDER — BUMETANIDE 1 MG/1
TABLET ORAL
Qty: 90 TABLET | Refills: 0 | Status: SHIPPED | OUTPATIENT
Start: 2023-07-31

## 2023-08-02 RX ORDER — POTASSIUM CHLORIDE 1500 MG/1
TABLET, EXTENDED RELEASE ORAL
Qty: 90 TABLET | Refills: 1 | Status: SHIPPED | OUTPATIENT
Start: 2023-08-02

## 2023-08-04 ENCOUNTER — OFFICE VISIT (OUTPATIENT)
Dept: FAMILY MEDICINE CLINIC | Facility: CLINIC | Age: 83
End: 2023-08-04
Payer: MEDICARE

## 2023-08-04 VITALS
WEIGHT: 201 LBS | HEART RATE: 58 BPM | DIASTOLIC BLOOD PRESSURE: 90 MMHG | HEIGHT: 65 IN | OXYGEN SATURATION: 98 % | BODY MASS INDEX: 33.49 KG/M2 | SYSTOLIC BLOOD PRESSURE: 186 MMHG

## 2023-08-04 DIAGNOSIS — E78.2 MIXED HYPERLIPIDEMIA: ICD-10-CM

## 2023-08-04 DIAGNOSIS — I10 PRIMARY HYPERTENSION: ICD-10-CM

## 2023-08-04 DIAGNOSIS — E11.43 TYPE 2 DIABETES MELLITUS WITH DIABETIC AUTONOMIC NEUROPATHY, WITHOUT LONG-TERM CURRENT USE OF INSULIN: Primary | ICD-10-CM

## 2023-08-04 PROCEDURE — 99214 OFFICE O/P EST MOD 30 MIN: CPT | Performed by: FAMILY MEDICINE

## 2023-08-05 LAB
ALBUMIN SERPL-MCNC: 3.5 G/DL (ref 3.7–4.7)
ALBUMIN/GLOB SERPL: 1.3 {RATIO} (ref 1.2–2.2)
ALP SERPL-CCNC: 78 IU/L (ref 44–121)
ALT SERPL-CCNC: 12 IU/L (ref 0–44)
AST SERPL-CCNC: 14 IU/L (ref 0–40)
BASOPHILS # BLD AUTO: 0 X10E3/UL (ref 0–0.2)
BASOPHILS NFR BLD AUTO: 0 %
BILIRUB SERPL-MCNC: 0.3 MG/DL (ref 0–1.2)
BUN SERPL-MCNC: 14 MG/DL (ref 8–27)
BUN/CREAT SERPL: 11 (ref 10–24)
CALCIUM SERPL-MCNC: 8.9 MG/DL (ref 8.6–10.2)
CHLORIDE SERPL-SCNC: 103 MMOL/L (ref 96–106)
CHOLEST SERPL-MCNC: 133 MG/DL (ref 100–199)
CO2 SERPL-SCNC: 25 MMOL/L (ref 20–29)
CREAT SERPL-MCNC: 1.26 MG/DL (ref 0.76–1.27)
EGFRCR SERPLBLD CKD-EPI 2021: 56 ML/MIN/1.73
EOSINOPHIL # BLD AUTO: 0.4 X10E3/UL (ref 0–0.4)
EOSINOPHIL NFR BLD AUTO: 8 %
ERYTHROCYTE [DISTWIDTH] IN BLOOD BY AUTOMATED COUNT: 13.8 % (ref 11.6–15.4)
GLOBULIN SER CALC-MCNC: 2.7 G/DL (ref 1.5–4.5)
GLUCOSE SERPL-MCNC: 97 MG/DL (ref 70–99)
HBA1C MFR BLD: 5.8 % (ref 4.8–5.6)
HCT VFR BLD AUTO: 37.3 % (ref 37.5–51)
HDLC SERPL-MCNC: 55 MG/DL
HGB BLD-MCNC: 12.3 G/DL (ref 13–17.7)
IMM GRANULOCYTES # BLD AUTO: 0 X10E3/UL (ref 0–0.1)
IMM GRANULOCYTES NFR BLD AUTO: 0 %
LDLC SERPL CALC-MCNC: 54 MG/DL (ref 0–99)
LYMPHOCYTES # BLD AUTO: 1.2 X10E3/UL (ref 0.7–3.1)
LYMPHOCYTES NFR BLD AUTO: 21 %
MCH RBC QN AUTO: 30.4 PG (ref 26.6–33)
MCHC RBC AUTO-ENTMCNC: 33 G/DL (ref 31.5–35.7)
MCV RBC AUTO: 92 FL (ref 79–97)
MONOCYTES # BLD AUTO: 0.4 X10E3/UL (ref 0.1–0.9)
MONOCYTES NFR BLD AUTO: 8 %
NEUTROPHILS # BLD AUTO: 3.6 X10E3/UL (ref 1.4–7)
NEUTROPHILS NFR BLD AUTO: 63 %
PLATELET # BLD AUTO: 107 X10E3/UL (ref 150–450)
POTASSIUM SERPL-SCNC: 4.4 MMOL/L (ref 3.5–5.2)
PROT SERPL-MCNC: 6.2 G/DL (ref 6–8.5)
RBC # BLD AUTO: 4.04 X10E6/UL (ref 4.14–5.8)
SODIUM SERPL-SCNC: 138 MMOL/L (ref 134–144)
TRIGL SERPL-MCNC: 138 MG/DL (ref 0–149)
TSH SERPL DL<=0.005 MIU/L-ACNC: 1.97 UIU/ML (ref 0.45–4.5)
VLDLC SERPL CALC-MCNC: 24 MG/DL (ref 5–40)
WBC # BLD AUTO: 5.7 X10E3/UL (ref 3.4–10.8)

## 2023-08-06 NOTE — PROGRESS NOTES
Follow Up Office Visit      Date of Visit:  2023   Patient Name: Carroll Frost  : 1940   MRN: [id number]     Chief Complaint:    Chief Complaint   Patient presents with    Numbness     HANDS       History of Present Illness: Carroll Frost is a 82 y.o. male who is here today for follow up.  Patient comes in with some numbness in his hands and also we are following up on some blood work that needs to be done because of his chronic hyperlipidemia hypertension and diabetes.  The hand that is bothering him worse is the right.  Prior injury with infection and a dog bite that was severe years ago.  History of carpal tunnel in that hand.        Subjective      Review of Systems:   Review of Systems   Constitutional:  Negative for fatigue and fever.   HENT:  Negative for congestion and ear pain.    Respiratory:  Negative for apnea, cough, chest tightness and shortness of breath.    Cardiovascular:  Negative for chest pain.   Gastrointestinal:  Negative for abdominal pain, constipation, diarrhea and nausea.   Musculoskeletal:  Negative for arthralgias.   Psychiatric/Behavioral:  Negative for depressed mood and stress.      Past Medical History:   Past Medical History:   Diagnosis Date    Acute midline low back pain without sciatica     BPH (benign prostatic hyperplasia)     Chronic bilateral low back pain without sciatica     Chronic sinusitis     hx chronic sinusitis and nasal polyps    Class 1 obesity due to excess calories with serious comorbidity and body mass index (BMI) of 30.0 to 30.9 in adult     Cluster headaches     resolved as he got older    Essential hypertension     Frequent falls     Glaucoma     H/O oral aphthous ulcers     Recurrent apthous ulcers    Hemorrhoids     hx of    History of poliomyelitis     Hyperlipidemia     meds    Hypertension     meds    Insomnia     Megaloblastic anemia     hx of    Mild episode of recurrent major depressive disorder     Mixed hyperlipidemia      "Neuropathy     Obstructive sleep apnea syndrome     CPAP, not successful using so far    Polio     hx polio    PUD (peptic ulcer disease)     hx PUD with H pylori - s/p antibiotic tx for H pylori    Shoulder arthritis        Past Surgical History: No past surgical history on file.    Family History:   Family History   Problem Relation Age of Onset    Asthma Mother     Hypertension Father        Social History:   Social History     Socioeconomic History    Marital status:    Tobacco Use    Smoking status: Never    Smokeless tobacco: Never    Tobacco comments:     Tobacco/Vaping use       Medications:     Current Outpatient Medications:     bumetanide (BUMEX) 1 MG tablet, TAKE ONE TABLET BY MOUTH DAILY, Disp: 90 tablet, Rfl: 0    gabapentin (NEURONTIN) 100 MG capsule, Take 1 capsule by mouth 3 (Three) Times a Day., Disp: 90 capsule, Rfl: 2    metoprolol tartrate (LOPRESSOR) 25 MG tablet, Take 1 tablet by mouth 2 (Two) Times a Day., Disp: 180 tablet, Rfl: 1    nabumetone (RELAFEN) 750 MG tablet, Take 1 tablet by mouth 2 (Two) Times a Day., Disp: 180 tablet, Rfl: 1    oxybutynin XL (DITROPAN-XL) 10 MG 24 hr tablet, TAKE ONE TABLET BY MOUTH DAILY, Disp: 90 tablet, Rfl: 0    pantoprazole (PROTONIX) 40 MG EC tablet, Take 1 tablet by mouth 2 (Two) Times a Day., Disp: 180 tablet, Rfl: 1    potassium chloride ER (K-TAB) 20 MEQ tablet controlled-release ER tablet, TAKE ONE TABLET BY MOUTH DAILY WITH FOOD, Disp: 90 tablet, Rfl: 1    simvastatin (ZOCOR) 40 MG tablet, Take 1 tablet by mouth Every Evening., Disp: 90 tablet, Rfl: 1    tamsulosin (FLOMAX) 0.4 MG capsule 24 hr capsule, TAKE ONE CAPSULE BY MOUTH DAILY, Disp: 90 capsule, Rfl: 1    Allergies:   No Known Allergies    Objective     Physical Exam:  Vital Signs:   Vitals:    08/04/23 1124   BP: (!) 186/90   Pulse: 58   SpO2: 98%   Weight: 91.2 kg (201 lb)   Height: 165.1 cm (65\")     Body mass index is 33.45 kg/mý.     Physical Exam  Vitals and nursing note reviewed. "   Constitutional:       General: He is not in acute distress.     Appearance: Normal appearance. He is not ill-appearing.   HENT:      Head: Normocephalic and atraumatic.      Right Ear: Tympanic membrane and ear canal normal.      Left Ear: Tympanic membrane and ear canal normal.      Nose: Nose normal.   Cardiovascular:      Rate and Rhythm: Normal rate and regular rhythm.      Heart sounds: Normal heart sounds.   Pulmonary:      Effort: Pulmonary effort is normal.      Breath sounds: Normal breath sounds.   Neurological:      Mental Status: He is alert and oriented to person, place, and time. Mental status is at baseline.   Psychiatric:         Mood and Affect: Mood normal.       Procedures      Assessment / Plan      Assessment/Plan:   Diagnoses and all orders for this visit:    1. Type 2 diabetes mellitus with diabetic autonomic neuropathy, without long-term current use of insulin (Primary)  -     Hemoglobin A1c    2. Mixed hyperlipidemia  -     Comprehensive Metabolic Panel  -     Lipid Panel    3. Primary hypertension  -     CBC Auto Differential  -     Comprehensive Metabolic Panel  -     Lipid Panel  -     TSH  -     CBC & Differential         We will check appropriate blood work because of his hypertension hyperlipidemia and diabetes.  Recommended referral to hand surgery for further evaluation of the hand numbness but patient currently declines.  He will wear some wrist braces at night when he sleeps.  He has had these for some time just has not started trying to use them at all.    Follow Up:   No follow-ups on file.    Carroll Malone  [facility] [place] [facility] [place]

## 2023-08-08 ENCOUNTER — CLINICAL SUPPORT (OUTPATIENT)
Dept: FAMILY MEDICINE CLINIC | Facility: CLINIC | Age: 83
End: 2023-08-08
Payer: MEDICARE

## 2023-08-08 VITALS — SYSTOLIC BLOOD PRESSURE: 170 MMHG | DIASTOLIC BLOOD PRESSURE: 80 MMHG

## 2023-08-08 RX ORDER — AMLODIPINE BESYLATE 5 MG/1
5 TABLET ORAL DAILY
Qty: 30 TABLET | Refills: 2 | Status: SHIPPED | OUTPATIENT
Start: 2023-08-08

## 2023-08-08 NOTE — Clinical Note
Patient came in to re-check BP. It was 180/81. We rechecked after sitting for 10 mins and it was 170/80. I told patient we would call him once any changes have been made

## 2023-08-09 ENCOUNTER — TELEPHONE (OUTPATIENT)
Dept: FAMILY MEDICINE CLINIC | Facility: CLINIC | Age: 83
End: 2023-08-09
Payer: MEDICARE

## 2023-08-09 NOTE — TELEPHONE ENCOUNTER
Deanna at [facility] called to report an interaction between his amlodipine and simvastatin, so Faisal decided to have him stop the statin and start the amlodipine and said next time we see him we will discuss starting another statin. Pt contacted

## 2023-08-11 RX ORDER — NABUMETONE 750 MG/1
TABLET, FILM COATED ORAL
Qty: 180 TABLET | Refills: 1 | Status: SHIPPED | OUTPATIENT
Start: 2023-08-11

## 2023-08-24 RX ORDER — TAMSULOSIN HYDROCHLORIDE 0.4 MG/1
CAPSULE ORAL
Qty: 90 CAPSULE | Refills: 1 | Status: SHIPPED | OUTPATIENT
Start: 2023-08-24

## 2023-09-14 ENCOUNTER — OFFICE VISIT (OUTPATIENT)
Dept: FAMILY MEDICINE CLINIC | Facility: CLINIC | Age: 83
End: 2023-09-14
Payer: MEDICARE

## 2023-09-14 VITALS
BODY MASS INDEX: 33.49 KG/M2 | DIASTOLIC BLOOD PRESSURE: 80 MMHG | OXYGEN SATURATION: 96 % | SYSTOLIC BLOOD PRESSURE: 146 MMHG | HEIGHT: 65 IN | WEIGHT: 201 LBS | HEART RATE: 89 BPM

## 2023-09-14 DIAGNOSIS — G56.01 CARPAL TUNNEL SYNDROME OF RIGHT WRIST: ICD-10-CM

## 2023-09-14 DIAGNOSIS — I10 PRIMARY HYPERTENSION: Primary | ICD-10-CM

## 2023-09-14 PROCEDURE — 99214 OFFICE O/P EST MOD 30 MIN: CPT | Performed by: FAMILY MEDICINE

## 2023-09-14 RX ORDER — AMLODIPINE BESYLATE 5 MG/1
5 TABLET ORAL DAILY
Qty: 30 TABLET | Refills: 5 | Status: SHIPPED | OUTPATIENT
Start: 2023-09-14

## 2023-09-15 NOTE — PROGRESS NOTES
Follow Up Office Visit      Date of Visit:  2023   Patient Name: Carroll Frost  : 1940   MRN: [id number]     Chief Complaint:    Chief Complaint   Patient presents with    Carpal Tunnel    Hypertension       History of Present Illness: Carroll Frost is a 82 y.o. male who is here today for follow up.  Patient bothered by carpal tunnel syndrome on the right wrist.  Numbness noted in the hand.  Patient also needing refills on hypertension medication.        Subjective      Review of Systems:   Review of Systems   Constitutional:  Negative for fatigue and fever.   HENT:  Negative for congestion and ear pain.    Respiratory:  Negative for apnea, cough, chest tightness and shortness of breath.    Cardiovascular:  Negative for chest pain.   Gastrointestinal:  Negative for abdominal pain, constipation, diarrhea and nausea.   Musculoskeletal:  Positive for arthralgias.   Psychiatric/Behavioral:  Negative for depressed mood and stress.      Past Medical History:   Past Medical History:   Diagnosis Date    Acute midline low back pain without sciatica     BPH (benign prostatic hyperplasia)     Chronic bilateral low back pain without sciatica     Chronic sinusitis     hx chronic sinusitis and nasal polyps    Class 1 obesity due to excess calories with serious comorbidity and body mass index (BMI) of 30.0 to 30.9 in adult     Cluster headaches     resolved as he got older    Essential hypertension     Frequent falls     Glaucoma     H/O oral aphthous ulcers     Recurrent apthous ulcers    Hemorrhoids     hx of    History of poliomyelitis     Hyperlipidemia     meds    Hypertension     meds    Insomnia     Megaloblastic anemia     hx of    Mild episode of recurrent major depressive disorder     Mixed hyperlipidemia     Neuropathy     Obstructive sleep apnea syndrome     CPAP, not successful using so far    Polio     hx polio    PUD (peptic ulcer disease)     hx PUD with H pylori - s/p antibiotic tx for H  "pylori    Shoulder arthritis        Past Surgical History: No past surgical history on file.    Family History:   Family History   Problem Relation Age of Onset    Asthma Mother     Hypertension Father        Social History:   Social History     Socioeconomic History    Marital status:    Tobacco Use    Smoking status: Never    Smokeless tobacco: Never    Tobacco comments:     Tobacco/Vaping use       Medications:     Current Outpatient Medications:     amLODIPine (NORVASC) 5 MG tablet, Take 1 tablet by mouth Daily., Disp: 30 tablet, Rfl: 5    bumetanide (BUMEX) 1 MG tablet, TAKE ONE TABLET BY MOUTH DAILY, Disp: 90 tablet, Rfl: 0    gabapentin (NEURONTIN) 100 MG capsule, Take 1 capsule by mouth 3 (Three) Times a Day., Disp: 90 capsule, Rfl: 2    metoprolol tartrate (LOPRESSOR) 25 MG tablet, TAKE ONE TABLET BY MOUTH TWICE A DAY, Disp: 180 tablet, Rfl: 1    nabumetone (RELAFEN) 750 MG tablet, TAKE ONE TABLET BY MOUTH TWICE A DAY, Disp: 180 tablet, Rfl: 1    oxybutynin XL (DITROPAN-XL) 10 MG 24 hr tablet, TAKE ONE TABLET BY MOUTH DAILY, Disp: 90 tablet, Rfl: 0    pantoprazole (PROTONIX) 40 MG EC tablet, Take 1 tablet by mouth 2 (Two) Times a Day., Disp: 180 tablet, Rfl: 1    potassium chloride ER (K-TAB) 20 MEQ tablet controlled-release ER tablet, TAKE ONE TABLET BY MOUTH DAILY WITH FOOD, Disp: 90 tablet, Rfl: 1    tamsulosin (FLOMAX) 0.4 MG capsule 24 hr capsule, TAKE ONE CAPSULE BY MOUTH DAILY, Disp: 90 capsule, Rfl: 1    Allergies:   No Known Allergies    Objective     Physical Exam:  Vital Signs:   Vitals:    09/14/23 1330   BP: 146/80   Pulse: 89   SpO2: 96%   Weight: 91.2 kg (201 lb)   Height: 165.1 cm (65\")     Body mass index is 33.45 kg/m².     Physical Exam  Vitals and nursing note reviewed.   Constitutional:       General: He is not in acute distress.     Appearance: Normal appearance. He is not ill-appearing.   HENT:      Head: Normocephalic and atraumatic.      Right Ear: Tympanic membrane and ear " canal normal.      Left Ear: Tympanic membrane and ear canal normal.      Nose: Nose normal.   Cardiovascular:      Rate and Rhythm: Normal rate and regular rhythm.      Heart sounds: Normal heart sounds.   Pulmonary:      Effort: Pulmonary effort is normal.      Breath sounds: Normal breath sounds.   Neurological:      Mental Status: He is alert and oriented to person, place, and time. Mental status is at baseline.   Psychiatric:         Mood and Affect: Mood normal.       Procedures      Assessment / Plan      Assessment/Plan:   Diagnoses and all orders for this visit:    1. Primary hypertension (Primary)    2. Carpal tunnel syndrome of right wrist    Other orders  -     amLODIPine (NORVASC) 5 MG tablet; Take 1 tablet by mouth Daily.  Dispense: 30 tablet; Refill: 5         Continue amlodipine for hypertension.  Medication refill given.  Patient declines referral to hand surgery right now.  We did mention using some braces again at night.  Declines injection here in the office today.    Follow Up:   No follow-ups on file.    Carroll Malone  [facility] [place] [facility] [place]

## 2023-10-11 RX ORDER — SIMVASTATIN 40 MG
40 TABLET ORAL EVERY EVENING
Qty: 90 TABLET | Refills: 1 | OUTPATIENT
Start: 2023-10-11

## 2023-10-13 ENCOUNTER — TELEPHONE (OUTPATIENT)
Dept: FAMILY MEDICINE CLINIC | Facility: CLINIC | Age: 83
End: 2023-10-13
Payer: MEDICARE

## 2023-10-13 NOTE — TELEPHONE ENCOUNTER
Caller: Carroll Frost    Relationship: Self    Best call back number: [phone]     What is the best time to reach you: ANYTIME, OK TO LEAVE VOICEMAIL.    Who are you requesting to speak with (clinical staff, provider,  specific staff member): CLINICAL STAFF    Do you know the name of the person who called: PATIENT    What was the call regarding: PATIENT ADVISES THAT HE WOULD LIKE A CALL BACK TO DISCUSS SOME MEDICATION QUESTION HE HAS.    Is it okay if the provider responds through MyChart: NO CALL ONLY

## 2023-10-23 ENCOUNTER — TELEPHONE (OUTPATIENT)
Dept: FAMILY MEDICINE CLINIC | Facility: CLINIC | Age: 83
End: 2023-10-23
Payer: MEDICARE

## 2023-10-23 NOTE — TELEPHONE ENCOUNTER
Caller: Carroll Frost    Relationship: Self    Best call back number: [phone]     Requested Prescriptions:   Requested Prescriptions      No prescriptions requested or ordered in this encounter      [facility]  Pharmacy where request should be sent: [facility] [id number] [place] [address] - [phone] [facility] [phone] FX     Last office visit with prescribing clinician: 9/14/2023   Last telemedicine visit with prescribing clinician: Visit date not found   Next office visit with prescribing clinician: Visit date not found         Does the patient have less than a 3 day supply:  [x] Yes  [] No        Uziel Gonzalez Rep   10/23/23 10:06 EDT

## 2023-10-23 NOTE — TELEPHONE ENCOUNTER
Back in August we told him to stop his simvastatin due to an interaction with amlodipine and that you would discuss starting another statin at his next visit, his visit in Sept he forgot to bring it up, so do you want him on another statin?

## 2023-10-24 RX ORDER — ROSUVASTATIN CALCIUM 10 MG/1
10 TABLET, COATED ORAL DAILY
Qty: 90 TABLET | Refills: 1 | Status: SHIPPED | OUTPATIENT
Start: 2023-10-24

## 2023-10-26 ENCOUNTER — TELEPHONE (OUTPATIENT)
Dept: FAMILY MEDICINE CLINIC | Facility: CLINIC | Age: 83
End: 2023-10-26
Payer: MEDICARE

## 2023-10-26 NOTE — TELEPHONE ENCOUNTER
Caller: Carroll Frost    Relationship: Self    Best call back number: [phone]    What is the best time to reach you: ANYTIME    Who are you requesting to speak with (clinical staff, provider,  specific staff member): PROVIDER OR NURSE    What was the call regarding: WOULD LIKE TO DISCUSS A MEDICATION TO SEE IF IT IS OK TO TAKE. PATIENT DID NOT SPECIFY THE NAME     Is it okay if the provider responds through MyChart: NO

## 2023-10-27 NOTE — TELEPHONE ENCOUNTER
Pt contacted, he wanted to know if he could take omega XL for joint health? And he said he didn't want any ducks, but he would take some more fish :)

## 2023-11-03 RX ORDER — OXYBUTYNIN CHLORIDE 10 MG/1
10 TABLET, EXTENDED RELEASE ORAL DAILY
Qty: 90 TABLET | Refills: 0 | Status: SHIPPED | OUTPATIENT
Start: 2023-11-03

## 2023-11-03 RX ORDER — BUMETANIDE 1 MG/1
1 TABLET ORAL DAILY
Qty: 90 TABLET | Refills: 0 | Status: SHIPPED | OUTPATIENT
Start: 2023-11-03

## 2023-11-13 DIAGNOSIS — E11.43 TYPE 2 DIABETES MELLITUS WITH DIABETIC AUTONOMIC NEUROPATHY, WITHOUT LONG-TERM CURRENT USE OF INSULIN: ICD-10-CM

## 2023-11-13 RX ORDER — GABAPENTIN 100 MG/1
100 CAPSULE ORAL 3 TIMES DAILY
Qty: 90 CAPSULE | Refills: 1 | Status: SHIPPED | OUTPATIENT
Start: 2023-11-13

## 2023-11-28 ENCOUNTER — NURSE TRIAGE (OUTPATIENT)
Dept: CALL CENTER | Facility: HOSPITAL | Age: 83
End: 2023-11-28
Payer: MEDICARE

## 2023-11-28 NOTE — TELEPHONE ENCOUNTER
HUB--Pt transferred to me from the HUB due to stating he had numbness in his right hand. After speaking with patient, pt has had this issue with his right hand for many years he states but it is getting worse.  It is from an old injury at work.  He states he has trouble moving it around at times but now it is more numb and tender.  He wants to make a dr appt only, does not want to go to the ED as advised due to he has had this issue for years.   He knows what it is from.  NO other s/s to report.  Pt refuses the ED at this time.  Protocols explained but still refuses.  Warm transfer to office,made an appt for Dr Malone.  Pt knows to call 911 if his s/s worsen.

## 2023-11-28 NOTE — TELEPHONE ENCOUNTER
"Reason for Disposition  • [1] Numbness (loss of sensation) of finger(s) AND [2] present now    Additional Information  • Negative: [1] Major bleeding (e.g., actively dripping or spurting) AND [2] can't be stopped  • Negative: Amputation  • Negative: Serious injury with multiple fractures (broken bones)  • Negative: Sounds like a life-threatening emergency to the triager  • Negative: Finger injury is main concern  • Negative: Caused by an animal bite  • Negative: Caused by a human bite  • Negative: Wound looks infected  • Negative: Cast problems or questions  • Negative: Hand pain from overuse (e.g., physical work, typing)  • Negative: Hand pain not from an injury  • Negative: Bullet wound, stabbed by knife, or other serious penetrating wound  • Negative: Looks like a broken bone (e.g., knuckle is gone or depressed)  • Negative: Looks like a dislocated joint (e.g., crooked or deformed)  • Negative: Cut over knuckle (MCP joint)  • Negative: Skin is split open or gaping  (or length > 1/2 inch or 12 mm)  • Negative: [1] Bleeding AND [2] won't stop after 10 minutes of direct pressure (using correct technique)  • Negative: [1] Dirt in the wound AND [2] not removed with 15 minutes of scrubbing  • Negative: High pressure injection injury (e.g., from grease gun or paint gun, usually work-related)  • Negative: Sounds like a serious injury to the triager  • Negative: [1] SEVERE pain AND [2] not improved 2 hours after pain medicine/ice packs    Answer Assessment - Initial Assessment Questions  1. MECHANISM: \"How did the injury happen?\"      Years ago--hurt his hand at work   2. ONSET: \"When did the injury happen?\" (Minutes or hours ago)       Years ago   3. APPEARANCE of INJURY: \"What does the injury look like?\"       Swollen/hurting   4. SEVERITY: \"Can you use the hand normally?\" \"Can you bend your fingers into a ball and then fully open them?\"      Mid to moderate pain--yes sometimes but numb  5. SIZE: For cuts, bruises, or " "swelling, ask: \"How large is it?\" (e.g., inches or centimeters;  entire hand or wrist)       N/a  6. PAIN: \"Is there pain?\" If Yes, ask: \"How bad is the pain?\"  (Scale 1-10; or mild, moderate, severe)      Mild   7. TETANUS: For any breaks in the skin, ask: \"When was the last tetanus booster?\"      N/a  8. OTHER SYMPTOMS: \"Do you have any other symptoms?\"       Numbness in right hand now--worsening   9. PREGNANCY: \"Is there any chance you are pregnant?\" \"When was your last menstrual period?\"      N/a    Protocols used: Hand and Wrist Injury-ADULT-    "

## 2024-01-02 RX ORDER — PANTOPRAZOLE SODIUM 40 MG/1
40 TABLET, DELAYED RELEASE ORAL 2 TIMES DAILY
Qty: 180 TABLET | Refills: 1 | Status: SHIPPED | OUTPATIENT
Start: 2024-01-02

## 2024-01-03 ENCOUNTER — OFFICE VISIT (OUTPATIENT)
Dept: FAMILY MEDICINE CLINIC | Facility: CLINIC | Age: 84
End: 2024-01-03
Payer: MEDICARE

## 2024-01-03 VITALS
DIASTOLIC BLOOD PRESSURE: 80 MMHG | HEART RATE: 76 BPM | WEIGHT: 212 LBS | HEIGHT: 66 IN | SYSTOLIC BLOOD PRESSURE: 150 MMHG | BODY MASS INDEX: 34.07 KG/M2 | OXYGEN SATURATION: 98 %

## 2024-01-03 DIAGNOSIS — H61.23 IMPACTED CERUMEN OF BOTH EARS: Primary | ICD-10-CM

## 2024-01-03 DIAGNOSIS — G56.01 CARPAL TUNNEL SYNDROME OF RIGHT WRIST: ICD-10-CM

## 2024-01-03 RX ORDER — TRIAMCINOLONE ACETONIDE 40 MG/ML
20 INJECTION, SUSPENSION INTRA-ARTICULAR; INTRAMUSCULAR
Status: COMPLETED | OUTPATIENT
Start: 2024-01-03 | End: 2024-01-03

## 2024-01-03 RX ADMIN — TRIAMCINOLONE ACETONIDE 20 MG: 40 INJECTION, SUSPENSION INTRA-ARTICULAR; INTRAMUSCULAR at 21:11

## 2024-01-04 NOTE — PROGRESS NOTES
Follow Up Office Visit      Date of Visit:  2024   Patient Name: Carroll Frost  : 1940   MRN: [id number]     Chief Complaint:    Chief Complaint   Patient presents with    Numbness       History of Present Illness: Carroll Frost is a 83 y.o. male who is here today for follow up.  Patient seen today for hearing loss and possible cerumen impaction.  Patient also seen with continued issues with carpal tunnel syndrome right hand.        Subjective      Review of Systems:   Review of Systems   Constitutional:  Negative for fatigue and fever.   HENT:  Positive for hearing loss. Negative for congestion and ear pain.    Respiratory:  Negative for apnea, cough, chest tightness and shortness of breath.    Cardiovascular:  Negative for chest pain.   Gastrointestinal:  Negative for abdominal pain, constipation, diarrhea and nausea.   Musculoskeletal:  Negative for arthralgias.   Neurological:  Positive for numbness.   Psychiatric/Behavioral:  Negative for depressed mood and stress.        Past Medical History:   Past Medical History:   Diagnosis Date    Acute midline low back pain without sciatica     BPH (benign prostatic hyperplasia)     Chronic bilateral low back pain without sciatica     Chronic sinusitis     hx chronic sinusitis and nasal polyps    Class 1 obesity due to excess calories with serious comorbidity and body mass index (BMI) of 30.0 to 30.9 in adult     Cluster headaches     resolved as he got older    Essential hypertension     Frequent falls     Glaucoma     H/O oral aphthous ulcers     Recurrent apthous ulcers    Hemorrhoids     hx of    History of poliomyelitis     Hyperlipidemia     meds    Hypertension     meds    Insomnia     Megaloblastic anemia     hx of    Mild episode of recurrent major depressive disorder     Mixed hyperlipidemia     Neuropathy     Obstructive sleep apnea syndrome     CPAP, not successful using so far    Polio     hx polio    PUD (peptic ulcer disease)     hx  "PUD with H pylori - s/p antibiotic tx for H pylori    Shoulder arthritis        Past Surgical History: No past surgical history on file.    Family History:   Family History   Problem Relation Age of Onset    Asthma Mother     Hypertension Father        Social History:   Social History     Socioeconomic History    Marital status:    Tobacco Use    Smoking status: Never    Smokeless tobacco: Never    Tobacco comments:     Tobacco/Vaping use       Medications:     Current Outpatient Medications:     amLODIPine (NORVASC) 5 MG tablet, Take 1 tablet by mouth Daily., Disp: 30 tablet, Rfl: 5    bumetanide (BUMEX) 1 MG tablet, TAKE 1 TABLET BY MOUTH DAILY, Disp: 90 tablet, Rfl: 0    gabapentin (NEURONTIN) 100 MG capsule, TAKE ONE CAPSULE BY MOUTH THREE TIMES A DAY, Disp: 90 capsule, Rfl: 1    metoprolol tartrate (LOPRESSOR) 25 MG tablet, TAKE ONE TABLET BY MOUTH TWICE A DAY, Disp: 180 tablet, Rfl: 1    nabumetone (RELAFEN) 750 MG tablet, TAKE ONE TABLET BY MOUTH TWICE A DAY, Disp: 180 tablet, Rfl: 1    oxybutynin XL (DITROPAN-XL) 10 MG 24 hr tablet, TAKE 1 TABLET BY MOUTH DAILY, Disp: 90 tablet, Rfl: 0    pantoprazole (PROTONIX) 40 MG EC tablet, TAKE 1 TABLET BY MOUTH TWICE A DAY, Disp: 180 tablet, Rfl: 1    potassium chloride ER (K-TAB) 20 MEQ tablet controlled-release ER tablet, TAKE ONE TABLET BY MOUTH DAILY WITH FOOD, Disp: 90 tablet, Rfl: 1    rosuvastatin (Crestor) 10 MG tablet, Take 1 tablet by mouth Daily., Disp: 90 tablet, Rfl: 1    tamsulosin (FLOMAX) 0.4 MG capsule 24 hr capsule, TAKE ONE CAPSULE BY MOUTH DAILY, Disp: 90 capsule, Rfl: 1    Allergies:   No Known Allergies    Objective     Physical Exam:  Vital Signs:   Vitals:    01/03/24 1432   BP: 150/80   Pulse: 76   SpO2: 98%   Weight: 96.2 kg (212 lb)   Height: 167.6 cm (66\")     Body mass index is 34.22 kg/m².     Physical Exam  Vitals and nursing note reviewed.   Constitutional:       General: He is not in acute distress.     Appearance: Normal " appearance. He is not ill-appearing.   HENT:      Head: Normocephalic and atraumatic.      Right Ear: Tympanic membrane and ear canal normal. There is impacted cerumen.      Left Ear: Tympanic membrane and ear canal normal. There is impacted cerumen.      Nose: Nose normal.   Cardiovascular:      Rate and Rhythm: Normal rate and regular rhythm.      Heart sounds: Normal heart sounds.   Pulmonary:      Effort: Pulmonary effort is normal.      Breath sounds: Normal breath sounds.   Neurological:      Mental Status: He is alert and oriented to person, place, and time. Mental status is at baseline.   Psychiatric:         Mood and Affect: Mood normal.         Arthrocentesis    Date/Time: 1/3/2024 9:11 PM    Performed by: Carroll Malone MD  Authorized by: Carroll Malone MD  Indications: pain   Body area: wrist  Joint: right wrist  Local anesthesia used: no    Anesthesia:  Local anesthesia used: no    Sedation:  Patient sedated: no    Needle size: 22 G  Ultrasound guidance: no  Approach: lateral  Meds administered: 20 mg triamcinolone acetonide 40 MG/ML  Patient tolerance: patient tolerated the procedure well with no immediate complications            Assessment / Plan      Assessment/Plan:   Diagnoses and all orders for this visit:    1. Impacted cerumen of both ears (Primary)  -     Ear Cerumen Removal    2. Carpal tunnel syndrome of right wrist    Other orders  -     Arthrocentesis         Cerumen removed in the office today.  Hearing with improvement.  Injection given in carpal tunnel to see if that relieves some of his symptoms.    Follow Up:   No follow-ups on file.    Carroll Malone  [facility] [place]

## 2024-01-09 ENCOUNTER — OFFICE VISIT (OUTPATIENT)
Dept: FAMILY MEDICINE CLINIC | Facility: CLINIC | Age: 84
End: 2024-01-09
Payer: MEDICARE

## 2024-01-09 VITALS
BODY MASS INDEX: 35.32 KG/M2 | WEIGHT: 212 LBS | DIASTOLIC BLOOD PRESSURE: 92 MMHG | SYSTOLIC BLOOD PRESSURE: 184 MMHG | TEMPERATURE: 97.5 F | HEART RATE: 85 BPM | HEIGHT: 65 IN | OXYGEN SATURATION: 97 %

## 2024-01-09 DIAGNOSIS — H61.21 CERUMEN DEBRIS ON TYMPANIC MEMBRANE OF RIGHT EAR: Primary | ICD-10-CM

## 2024-01-09 NOTE — PROGRESS NOTES
Office Note     Name: Carroll Frost    : 1940     MRN: [id number]     Chief Complaint  Earache (Pt is here for bilateral ear discomfort and numbness. )    Subjective     History of Present Illness:  Carroll Frost is a 83 y.o. male who presents today for:      Has had fullness and buzzing sound in both ears, came in last week and had both eard cleaned .   Still has the buzzign noise despite gettign a lot of was out.    Has started having numbness  along the front of the ear and cheek       Review of Systems:   Review of Systems    Past Medical History:   Past Medical History:   Diagnosis Date    Acute midline low back pain without sciatica     BPH (benign prostatic hyperplasia)     Chronic bilateral low back pain without sciatica     Chronic sinusitis     hx chronic sinusitis and nasal polyps    Class 1 obesity due to excess calories with serious comorbidity and body mass index (BMI) of 30.0 to 30.9 in adult     Cluster headaches     resolved as he got older    Essential hypertension     Frequent falls     Glaucoma     H/O oral aphthous ulcers     Recurrent apthous ulcers    Hemorrhoids     hx of    History of poliomyelitis     Hyperlipidemia     meds    Hypertension     meds    Insomnia     Megaloblastic anemia     hx of    Mild episode of recurrent major depressive disorder     Mixed hyperlipidemia     Neuropathy     Obstructive sleep apnea syndrome     CPAP, not successful using so far    Polio     hx polio    PUD (peptic ulcer disease)     hx PUD with H pylori - s/p antibiotic tx for H pylori    Shoulder arthritis        Past Surgical History: History reviewed. No pertinent surgical history.    Family History:   Family History   Problem Relation Age of Onset    Asthma Mother     Hypertension Father        Social History:   Social History     Socioeconomic History    Marital status:    Tobacco Use    Smoking status: Never    Smokeless tobacco: Never    Tobacco comments:     Tobacco/Vaping use  "  Vaping Use    Vaping Use: Never used   Substance and Sexual Activity    Alcohol use: Not Currently    Drug use: Defer    Sexual activity: Defer       Immunizations:   Immunization History   Administered Date(s) Administered    COVID-19 (PFIZER) BIVALENT 12+YRS 10/28/2022    COVID-19 (PFIZER) Purple Cap Monovalent 02/01/2021, 02/22/2021, 10/22/2021    Covid-19 (Pfizer) Gray Cap Monovalent 06/28/2022    Influenza, Unspecified 08/28/2021    Pneumococcal, Unspecified 08/01/2013, 08/29/2015    Zoster, Unspecified 05/20/2010        Medications:     Current Outpatient Medications:     amLODIPine (NORVASC) 5 MG tablet, Take 1 tablet by mouth Daily., Disp: 30 tablet, Rfl: 5    bumetanide (BUMEX) 1 MG tablet, TAKE 1 TABLET BY MOUTH DAILY, Disp: 90 tablet, Rfl: 0    gabapentin (NEURONTIN) 100 MG capsule, TAKE ONE CAPSULE BY MOUTH THREE TIMES A DAY, Disp: 90 capsule, Rfl: 1    metoprolol tartrate (LOPRESSOR) 25 MG tablet, TAKE ONE TABLET BY MOUTH TWICE A DAY, Disp: 180 tablet, Rfl: 1    nabumetone (RELAFEN) 750 MG tablet, TAKE ONE TABLET BY MOUTH TWICE A DAY, Disp: 180 tablet, Rfl: 1    pantoprazole (PROTONIX) 40 MG EC tablet, TAKE 1 TABLET BY MOUTH TWICE A DAY, Disp: 180 tablet, Rfl: 1    potassium chloride ER (K-TAB) 20 MEQ tablet controlled-release ER tablet, TAKE ONE TABLET BY MOUTH DAILY WITH FOOD, Disp: 90 tablet, Rfl: 1    tamsulosin (FLOMAX) 0.4 MG capsule 24 hr capsule, TAKE ONE CAPSULE BY MOUTH DAILY, Disp: 90 capsule, Rfl: 1    oxybutynin XL (DITROPAN-XL) 10 MG 24 hr tablet, TAKE 1 TABLET BY MOUTH DAILY, Disp: 90 tablet, Rfl: 0    rosuvastatin (CRESTOR) 10 MG tablet, TAKE 1 TABLET BY MOUTH DAILY, Disp: 90 tablet, Rfl: 1    Allergies:   No Known Allergies    Objective     Vital Signs  BP (!) 184/92   Pulse 85   Temp 97.5 °F (36.4 °C) (Oral)   Ht 165.1 cm (65\")   Wt 96.2 kg (212 lb)   SpO2 97%   BMI 35.28 kg/m²   Estimated body mass index is 35.28 kg/m² as calculated from the following:    Height as of this " "encounter: 165.1 cm (65\").    Weight as of this encounter: 96.2 kg (212 lb).            Physical Exam  Vitals and nursing note reviewed.   Constitutional:       Appearance: Normal appearance.   HENT:      Right Ear: There is impacted cerumen.      Left Ear: Tympanic membrane and ear canal normal.   Cardiovascular:      Rate and Rhythm: Normal rate and regular rhythm.      Heart sounds: No murmur heard.     No friction rub. No gallop.   Pulmonary:      Effort: Pulmonary effort is normal.      Breath sounds: Normal breath sounds. No wheezing, rhonchi or rales.   Neurological:      Mental Status: He is alert.            Ear Cerumen Removal    Date/Time: 1/9/2024 8:10 AM    Performed by: Faviola Rodríguez MD  Authorized by: Faviola Rodríguez MD    Anesthesia:  Local Anesthetic: none  Ceruminolytics applied: Ceruminolytics applied prior to the procedure.  Location details: right ear  Patient tolerance: patient tolerated the procedure well with no immediate complications  Procedure type: instrumentation, irrigation   Sedation:  Patient sedated: no             Assessment and Plan     1. Cerumen debris on tympanic membrane of right ear  Cerumen impaction resolves after repeat irrigation  - Ear Cerumen Removal       Follow Up  Return for Keep your next scheduled follow up with Dr Malone in February.    Patient was advised to call the office or seek medical care if  any issues discussed during this visit worsen or persist or if new concerns arise        [facility]  [address] [place] [address] [id number] [phone]  "

## 2024-01-16 RX ORDER — OXYBUTYNIN CHLORIDE 10 MG/1
10 TABLET, EXTENDED RELEASE ORAL DAILY
Qty: 90 TABLET | Refills: 0 | Status: SHIPPED | OUTPATIENT
Start: 2024-01-16

## 2024-01-17 ENCOUNTER — TELEPHONE (OUTPATIENT)
Dept: FAMILY MEDICINE CLINIC | Facility: CLINIC | Age: 84
End: 2024-01-17
Payer: MEDICARE

## 2024-01-19 RX ORDER — ROSUVASTATIN CALCIUM 10 MG/1
10 TABLET, COATED ORAL DAILY
Qty: 90 TABLET | Refills: 1 | Status: SHIPPED | OUTPATIENT
Start: 2024-01-19

## 2024-02-12 ENCOUNTER — OFFICE VISIT (OUTPATIENT)
Dept: FAMILY MEDICINE CLINIC | Facility: CLINIC | Age: 84
End: 2024-02-12
Payer: MEDICARE

## 2024-02-12 VITALS
DIASTOLIC BLOOD PRESSURE: 90 MMHG | WEIGHT: 212 LBS | BODY MASS INDEX: 35.32 KG/M2 | HEIGHT: 65 IN | OXYGEN SATURATION: 99 % | HEART RATE: 66 BPM | SYSTOLIC BLOOD PRESSURE: 160 MMHG

## 2024-02-12 DIAGNOSIS — H93.11 TINNITUS OF RIGHT EAR: Primary | ICD-10-CM

## 2024-02-12 PROCEDURE — 99213 OFFICE O/P EST LOW 20 MIN: CPT | Performed by: FAMILY MEDICINE

## 2024-02-19 RX ORDER — TAMSULOSIN HYDROCHLORIDE 0.4 MG/1
1 CAPSULE ORAL DAILY
Qty: 90 CAPSULE | Refills: 1 | Status: SHIPPED | OUTPATIENT
Start: 2024-02-19

## 2024-03-12 RX ORDER — NABUMETONE 750 MG/1
750 TABLET, FILM COATED ORAL 2 TIMES DAILY
Qty: 180 TABLET | Refills: 1 | Status: SHIPPED | OUTPATIENT
Start: 2024-03-12

## 2024-03-13 ENCOUNTER — TELEPHONE (OUTPATIENT)
Dept: FAMILY MEDICINE CLINIC | Facility: CLINIC | Age: 84
End: 2024-03-13
Payer: MEDICARE

## 2024-04-15 RX ORDER — OXYBUTYNIN CHLORIDE 10 MG/1
10 TABLET, EXTENDED RELEASE ORAL DAILY
Qty: 90 TABLET | Refills: 0 | Status: SHIPPED | OUTPATIENT
Start: 2024-04-15

## 2024-04-17 RX ORDER — POTASSIUM CHLORIDE 1500 MG/1
TABLET, EXTENDED RELEASE ORAL
Qty: 90 TABLET | Refills: 1 | Status: SHIPPED | OUTPATIENT
Start: 2024-04-17

## 2024-04-29 RX ORDER — AMLODIPINE BESYLATE 5 MG/1
5 TABLET ORAL DAILY
Qty: 30 TABLET | Refills: 5 | Status: SHIPPED | OUTPATIENT
Start: 2024-04-29

## 2024-05-09 ENCOUNTER — OFFICE VISIT (OUTPATIENT)
Dept: FAMILY MEDICINE CLINIC | Facility: CLINIC | Age: 84
End: 2024-05-09
Payer: MEDICARE

## 2024-05-09 VITALS
HEIGHT: 65 IN | HEART RATE: 68 BPM | SYSTOLIC BLOOD PRESSURE: 170 MMHG | OXYGEN SATURATION: 93 % | DIASTOLIC BLOOD PRESSURE: 100 MMHG | BODY MASS INDEX: 35.32 KG/M2 | WEIGHT: 212 LBS

## 2024-05-09 DIAGNOSIS — I10 PRIMARY HYPERTENSION: Primary | ICD-10-CM

## 2024-05-09 DIAGNOSIS — R53.83 OTHER FATIGUE: ICD-10-CM

## 2024-05-09 PROCEDURE — 99214 OFFICE O/P EST MOD 30 MIN: CPT | Performed by: FAMILY MEDICINE

## 2024-05-09 RX ORDER — AMLODIPINE BESYLATE 10 MG/1
10 TABLET ORAL DAILY
Qty: 30 TABLET | Refills: 5 | Status: SHIPPED | OUTPATIENT
Start: 2024-05-09

## 2024-05-10 LAB
ALBUMIN SERPL-MCNC: 3.6 G/DL (ref 3.7–4.7)
ALBUMIN/GLOB SERPL: 1.4 {RATIO} (ref 1.2–2.2)
ALP SERPL-CCNC: 78 IU/L (ref 44–121)
ALT SERPL-CCNC: 19 IU/L (ref 0–44)
AST SERPL-CCNC: 17 IU/L (ref 0–40)
BASOPHILS # BLD AUTO: 0 X10E3/UL (ref 0–0.2)
BASOPHILS NFR BLD AUTO: 1 %
BILIRUB SERPL-MCNC: 0.3 MG/DL (ref 0–1.2)
BUN SERPL-MCNC: 15 MG/DL (ref 8–27)
BUN/CREAT SERPL: 11 (ref 10–24)
CALCIUM SERPL-MCNC: 9 MG/DL (ref 8.6–10.2)
CHLORIDE SERPL-SCNC: 101 MMOL/L (ref 96–106)
CHOLEST SERPL-MCNC: 125 MG/DL (ref 100–199)
CO2 SERPL-SCNC: 22 MMOL/L (ref 20–29)
CREAT SERPL-MCNC: 1.4 MG/DL (ref 0.76–1.27)
EGFRCR SERPLBLD CKD-EPI 2021: 49 ML/MIN/1.73
EOSINOPHIL # BLD AUTO: 0.4 X10E3/UL (ref 0–0.4)
EOSINOPHIL NFR BLD AUTO: 7 %
ERYTHROCYTE [DISTWIDTH] IN BLOOD BY AUTOMATED COUNT: 13.9 % (ref 11.6–15.4)
GLOBULIN SER CALC-MCNC: 2.6 G/DL (ref 1.5–4.5)
GLUCOSE SERPL-MCNC: 80 MG/DL (ref 70–99)
HCT VFR BLD AUTO: 36.4 % (ref 37.5–51)
HDLC SERPL-MCNC: 55 MG/DL
HGB BLD-MCNC: 12.1 G/DL (ref 13–17.7)
IMM GRANULOCYTES # BLD AUTO: 0 X10E3/UL (ref 0–0.1)
IMM GRANULOCYTES NFR BLD AUTO: 1 %
LDLC SERPL CALC-MCNC: 55 MG/DL (ref 0–99)
LYMPHOCYTES # BLD AUTO: 1.2 X10E3/UL (ref 0.7–3.1)
LYMPHOCYTES NFR BLD AUTO: 19 %
MCH RBC QN AUTO: 30.3 PG (ref 26.6–33)
MCHC RBC AUTO-ENTMCNC: 33.2 G/DL (ref 31.5–35.7)
MCV RBC AUTO: 91 FL (ref 79–97)
MONOCYTES # BLD AUTO: 0.6 X10E3/UL (ref 0.1–0.9)
MONOCYTES NFR BLD AUTO: 9 %
NEUTROPHILS # BLD AUTO: 4.1 X10E3/UL (ref 1.4–7)
NEUTROPHILS NFR BLD AUTO: 63 %
PLATELET # BLD AUTO: 122 X10E3/UL (ref 150–450)
POTASSIUM SERPL-SCNC: 4.7 MMOL/L (ref 3.5–5.2)
PROT SERPL-MCNC: 6.2 G/DL (ref 6–8.5)
RBC # BLD AUTO: 4 X10E6/UL (ref 4.14–5.8)
SODIUM SERPL-SCNC: 136 MMOL/L (ref 134–144)
TRIGL SERPL-MCNC: 76 MG/DL (ref 0–149)
TSH SERPL DL<=0.005 MIU/L-ACNC: 2.12 UIU/ML (ref 0.45–4.5)
VLDLC SERPL CALC-MCNC: 15 MG/DL (ref 5–40)
WBC # BLD AUTO: 6.5 X10E3/UL (ref 3.4–10.8)

## 2024-05-14 ENCOUNTER — TELEPHONE (OUTPATIENT)
Dept: FAMILY MEDICINE CLINIC | Facility: CLINIC | Age: 84
End: 2024-05-14
Payer: MEDICARE

## 2024-05-14 NOTE — TELEPHONE ENCOUNTER
Caller: IVELISSE WITH [employer]    Best call back number: [phone]     What form or medical record are you requesting: SIGNED AND DATED PRESCRIPTION AUTHORIZATION FOR BACK AND KNEE SUPPORT    Who is requesting this form or medical record from you: [facility]    How would you like to receive the form or medical records (pick-up, mail, fax): FAX  If fax, what is the fax number: [phone] ATTACH LAST OFFICE VISIT NOTES AS WELL PLEASE    Timeframe paperwork needed: ASAP    Additional notes: THEY SAID THAT THE PAPERWORK WAS FAXED OVER ON 05.07.24. IT IS 3 PAGES INCLUDING THE COVER PAGE. THEY SAID THAT THEY JUST NEED TO OFFICE VISIT NOTES AND TO HAVE THIS SIGNED AND DATED. PLEASE SEND BACK TO THEM. IF THERE ARE ANY QUESTIONS OR CONCERNS PLEASE CALL.

## 2024-05-16 ENCOUNTER — TELEPHONE (OUTPATIENT)
Dept: FAMILY MEDICINE CLINIC | Facility: CLINIC | Age: 84
End: 2024-05-16
Payer: MEDICARE

## 2024-05-16 NOTE — TELEPHONE ENCOUNTER
Caller: IVELISSE WITH [employer]     Relationship: [unfilled]     Best call back number: [phone]    What is your medical concern? CALLED TO VERIFY IF WE HAVE RECEIVED AND FAXED BACK PAPERWORK THAT WAS FAXED OVER TO OFFICE COUPLE DAYS AGO. STATED THAT PAPERWORK NEEDS TO BE SIGNED.

## 2024-06-04 DIAGNOSIS — E11.43 TYPE 2 DIABETES MELLITUS WITH DIABETIC AUTONOMIC NEUROPATHY, WITHOUT LONG-TERM CURRENT USE OF INSULIN: ICD-10-CM

## 2024-06-04 RX ORDER — GABAPENTIN 100 MG/1
100 CAPSULE ORAL 3 TIMES DAILY
Qty: 90 CAPSULE | Refills: 1 | Status: SHIPPED | OUTPATIENT
Start: 2024-06-04

## 2024-06-19 ENCOUNTER — TELEPHONE (OUTPATIENT)
Dept: FAMILY MEDICINE CLINIC | Facility: CLINIC | Age: 84
End: 2024-06-19

## 2024-06-19 ENCOUNTER — OFFICE VISIT (OUTPATIENT)
Dept: FAMILY MEDICINE CLINIC | Facility: CLINIC | Age: 84
End: 2024-06-19
Payer: MEDICARE

## 2024-06-19 VITALS
WEIGHT: 201 LBS | HEIGHT: 65 IN | DIASTOLIC BLOOD PRESSURE: 76 MMHG | OXYGEN SATURATION: 96 % | HEART RATE: 88 BPM | BODY MASS INDEX: 33.49 KG/M2 | SYSTOLIC BLOOD PRESSURE: 140 MMHG

## 2024-06-19 DIAGNOSIS — I10 PRIMARY HYPERTENSION: Primary | ICD-10-CM

## 2024-06-19 DIAGNOSIS — R60.9 EDEMA, UNSPECIFIED TYPE: ICD-10-CM

## 2024-06-19 DIAGNOSIS — Z86.12 HISTORY OF POLIOMYELITIS: ICD-10-CM

## 2024-06-19 PROCEDURE — 99214 OFFICE O/P EST MOD 30 MIN: CPT | Performed by: FAMILY MEDICINE

## 2024-06-19 RX ORDER — BUMETANIDE 1 MG/1
1 TABLET ORAL DAILY
Qty: 90 TABLET | Refills: 1 | Status: SHIPPED | OUTPATIENT
Start: 2024-06-19

## 2024-06-19 NOTE — PROGRESS NOTES
Follow Up Office Visit      Date of Visit:  2024   Patient Name: Carroll Frost  : 1940   MRN: [id number]     Chief Complaint:    Chief Complaint   Patient presents with    Edema       History of Present Illness: Carroll Frost is a 83 y.o. male who is here today for follow up.    History of Present Illness  The patient presents for follow-up of leg swelling.     The patient reports overall good health.  He has been adhering to a regimen of amlodipine 10 mg for his HTN.  Much better controlled today.  He is also taking bumetanide, the latter of which he is uncertain about for sure.  He has some lower leg swelling.  Probably related to the norvasc. He expresses a desire for a new long-legged brace, which he has been using for approximately 8 to 9 years. He Has a history of polio.  His exercise regimen has been limited but he plans to resume this activity next week.      Subjective      Review of Systems:   Review of Systems   Constitutional:  Negative for fatigue and fever.   HENT:  Negative for congestion and ear pain.    Respiratory:  Negative for apnea, cough, chest tightness and shortness of breath.    Cardiovascular:  Negative for chest pain.   Gastrointestinal:  Negative for abdominal pain, constipation, diarrhea and nausea.   Musculoskeletal:  Negative for arthralgias.   Psychiatric/Behavioral:  Negative for depressed mood and stress.        Past Medical History:   Past Medical History:   Diagnosis Date    Acute midline low back pain without sciatica     BPH (benign prostatic hyperplasia)     Chronic bilateral low back pain without sciatica     Chronic sinusitis     hx chronic sinusitis and nasal polyps    Class 1 obesity due to excess calories with serious comorbidity and body mass index (BMI) of 30.0 to 30.9 in adult     Cluster headaches     resolved as he got older    Essential hypertension     Frequent falls     Glaucoma     H/O oral aphthous ulcers     Recurrent apthous ulcers     Hemorrhoids     hx of    History of poliomyelitis     Hyperlipidemia     meds    Hypertension     meds    Insomnia     Megaloblastic anemia     hx of    Mild episode of recurrent major depressive disorder     Mixed hyperlipidemia     Neuropathy     Obstructive sleep apnea syndrome     CPAP, not successful using so far    Polio     hx polio    PUD (peptic ulcer disease)     hx PUD with H pylori - s/p antibiotic tx for H pylori    Shoulder arthritis        Past Surgical History: No past surgical history on file.    Family History:   Family History   Problem Relation Age of Onset    Asthma Mother     Hypertension Father        Social History:   Social History     Socioeconomic History    Marital status:    Tobacco Use    Smoking status: Never    Smokeless tobacco: Never    Tobacco comments:     Tobacco/Vaping use   Vaping Use    Vaping status: Never Used   Substance and Sexual Activity    Alcohol use: Not Currently    Drug use: Defer    Sexual activity: Defer       Medications:     Current Outpatient Medications:     amLODIPine (NORVASC) 10 MG tablet, Take 1 tablet by mouth Daily., Disp: 30 tablet, Rfl: 5    bumetanide (BUMEX) 1 MG tablet, Take 1 tablet by mouth Daily., Disp: 90 tablet, Rfl: 1    gabapentin (NEURONTIN) 100 MG capsule, TAKE ONE CAPSULE BY MOUTH THREE TIMES A DAY, Disp: 90 capsule, Rfl: 1    metoprolol tartrate (LOPRESSOR) 25 MG tablet, TAKE 1 TABLET BY MOUTH TWICE A DAY, Disp: 180 tablet, Rfl: 1    nabumetone (RELAFEN) 750 MG tablet, TAKE 1 TABLET BY MOUTH TWICE A DAY, Disp: 180 tablet, Rfl: 1    oxybutynin XL (DITROPAN-XL) 10 MG 24 hr tablet, TAKE 1 TABLET BY MOUTH DAILY, Disp: 90 tablet, Rfl: 0    pantoprazole (PROTONIX) 40 MG EC tablet, TAKE 1 TABLET BY MOUTH TWICE A DAY, Disp: 180 tablet, Rfl: 1    potassium chloride ER (K-TAB) 20 MEQ tablet controlled-release ER tablet, TAKE ONE TABLET BY MOUTH DAILY WITH FOOD, Disp: 90 tablet, Rfl: 1    rosuvastatin (CRESTOR) 10 MG tablet, TAKE 1 TABLET BY  "MOUTH DAILY, Disp: 90 tablet, Rfl: 1    tamsulosin (FLOMAX) 0.4 MG capsule 24 hr capsule, TAKE 1 CAPSULE BY MOUTH DAILY, Disp: 90 capsule, Rfl: 1    Allergies:   No Known Allergies    Objective     Physical Exam:  Vital Signs:   Vitals:    06/19/24 1109   BP: 140/76   Pulse: 88   SpO2: 96%   Weight: 91.2 kg (201 lb)   Height: 165.1 cm (65\")     Body mass index is 33.45 kg/m².     Physical Exam  Vitals and nursing note reviewed.   Constitutional:       General: He is not in acute distress.     Appearance: Normal appearance. He is not ill-appearing.   HENT:      Head: Normocephalic and atraumatic.      Right Ear: Tympanic membrane and ear canal normal.      Left Ear: Tympanic membrane and ear canal normal.      Nose: Nose normal.   Cardiovascular:      Rate and Rhythm: Normal rate and regular rhythm.      Heart sounds: Normal heart sounds.   Pulmonary:      Effort: Pulmonary effort is normal.      Breath sounds: Normal breath sounds.   Musculoskeletal:      Right lower leg: Edema present.      Left lower leg: Edema present.   Neurological:      Mental Status: He is alert and oriented to person, place, and time. Mental status is at baseline.   Psychiatric:         Mood and Affect: Mood normal.       Physical Exam      Procedures    Results    Assessment / Plan      Assessment/Plan:   Diagnoses and all orders for this visit:    1. Primary hypertension (Primary)    2. Edema, unspecified type    3. History of poliomyelitis  -     Miscellaneous DME       Assessment & Plan    The patient's leg swelling is likely attributable to his reliance on the affected leg, as evidenced by his other leg being weaker than the other as well as the amlodipine.   A weight loss of approximately 11 pounds has been noted, and his blood pressure has shown improvement. The amlodipine dosage was previously increased from 5 mg to 10 mg, which significantly improved his blood pressure. However, this adjustment may have resulted in minor swelling. " The patient was advised to verify his bumetanide dosage at home and to contact us this afternoon to inform us. An order for a new long-legged brace was provided due to his history of polio and leg weakness. He was also encouraged to resume his exercise regimen.        Follow Up:   No follow-ups on file.    Carroll Malone  [facility] [place]     Patient or patient representative verbalized consent for the use of Ambient Listening during the visit with  Carroll Malone MD for chart documentation. 6/19/2024  14:48 EDT

## 2024-06-19 NOTE — TELEPHONE ENCOUNTER
Incoming Refill Request      Medication requested (name and dose): Bumetanide 1 mg tablet    Pharm[facility] where request should be sent[facility] in [place], [place]    Additional details provided by patient: pt is out of medication.    Best call back number: [phone]    Does the patient have less than a 3 day supply:  [x] Yes  [] No    Uziel Shi Rep  06/19/24, 13:54 EDT

## 2024-07-01 ENCOUNTER — OFFICE VISIT (OUTPATIENT)
Dept: FAMILY MEDICINE CLINIC | Facility: CLINIC | Age: 84
End: 2024-07-01
Payer: MEDICARE

## 2024-07-01 VITALS
OXYGEN SATURATION: 99 % | SYSTOLIC BLOOD PRESSURE: 132 MMHG | DIASTOLIC BLOOD PRESSURE: 76 MMHG | HEART RATE: 73 BPM | HEIGHT: 65 IN | WEIGHT: 208 LBS | BODY MASS INDEX: 34.66 KG/M2

## 2024-07-01 DIAGNOSIS — Z86.12 HISTORY OF POLIOMYELITIS: ICD-10-CM

## 2024-07-01 DIAGNOSIS — F39 MOOD DISORDER: ICD-10-CM

## 2024-07-01 DIAGNOSIS — R53.83 OTHER FATIGUE: ICD-10-CM

## 2024-07-01 DIAGNOSIS — R60.9 EDEMA, UNSPECIFIED TYPE: Primary | ICD-10-CM

## 2024-07-01 PROCEDURE — 99214 OFFICE O/P EST MOD 30 MIN: CPT | Performed by: FAMILY MEDICINE

## 2024-07-01 RX ORDER — BUMETANIDE 1 MG/1
TABLET ORAL
Qty: 120 TABLET | Refills: 3 | Status: SHIPPED | OUTPATIENT
Start: 2024-07-01

## 2024-07-01 RX ORDER — BUPROPION HYDROCHLORIDE 150 MG/1
150 TABLET ORAL DAILY
Qty: 60 TABLET | Refills: 2 | Status: SHIPPED | OUTPATIENT
Start: 2024-07-01

## 2024-07-01 NOTE — PROGRESS NOTES
Follow Up Office Visit      Date of Visit:  2024   Patient Name: Carroll Frost  : 1940   MRN: [id number]     Chief Complaint:    Chief Complaint   Patient presents with    Edema     Pt is here for bilateral leg swelling onset 2 weeks.     Hand Pain     Pt complains of right hand pain.        History of Present Illness: Carroll Frost is a 83 y.o. male who is here today for follow up.    History of Present Illness  The patient comes in today to discuss his lower extremity edema. We felt that his swelling is probably somewhat related to his amlodipine. He also has to rely on the affected leg quite a bit because of his history of polio. I wanted him to look at home and see for sure if he was taking the bumetanide for swelling or some other diuretic. I also gave him an order at our last visit for a new long leg brace to help with his leg weakness related to his history of polio.    The patient has yet to procure a new long leg brace from the [facility] in [place]. He expresses a desire to acquire a new brace due to leg weakness associated with his history of polio.     For the swelling, He was previously on a regimen of bumetanide once daily, however, the efficacy of this medication has diminished. He reports no prior history of leg swelling.    The patient expresses a desire to supplement his energy levels. He reports persistent fatigue and a lack of energy to drive. Despite taking vitamins, he reports no noticeable improvement. He resides alone and experiences feelings of depression, leading to a lack of socialization. He spends most of his day engaging in activities such as attending a [employer] home on  and Sundays. He also reports feelings of boredom.      Subjective      Review of Systems:   Review of Systems   Constitutional:  Positive for fatigue. Negative for fever.   HENT:  Negative for congestion and ear pain.    Respiratory:  Negative for apnea, cough, chest  tightness and shortness of breath.    Cardiovascular:  Negative for chest pain.   Gastrointestinal:  Negative for abdominal pain, constipation, diarrhea and nausea.   Musculoskeletal:  Negative for arthralgias.   Psychiatric/Behavioral:  Positive for depressed mood. Negative for stress.        Past Medical History:   Past Medical History:   Diagnosis Date    Acute midline low back pain without sciatica     BPH (benign prostatic hyperplasia)     Chronic bilateral low back pain without sciatica     Chronic sinusitis     hx chronic sinusitis and nasal polyps    Class 1 obesity due to excess calories with serious comorbidity and body mass index (BMI) of 30.0 to 30.9 in adult     Cluster headaches     resolved as he got older    Essential hypertension     Frequent falls     Glaucoma     H/O oral aphthous ulcers     Recurrent apthous ulcers    Hemorrhoids     hx of    History of poliomyelitis     Hyperlipidemia     meds    Hypertension     meds    Insomnia     Megaloblastic anemia     hx of    Mild episode of recurrent major depressive disorder     Mixed hyperlipidemia     Neuropathy     Obstructive sleep apnea syndrome     CPAP, not successful using so far    Polio     hx polio    PUD (peptic ulcer disease)     hx PUD with H pylori - s/p antibiotic tx for H pylori    Shoulder arthritis        Past Surgical History: History reviewed. No pertinent surgical history.    Family History:   Family History   Problem Relation Age of Onset    Asthma Mother     Hypertension Father        Social History:   Social History     Socioeconomic History    Marital status:    Tobacco Use    Smoking status: Never    Smokeless tobacco: Never    Tobacco comments:     Tobacco/Vaping use   Vaping Use    Vaping status: Never Used   Substance and Sexual Activity    Alcohol use: Not Currently    Drug use: Defer    Sexual activity: Defer       Medications:     Current Outpatient Medications:     amLODIPine (NORVASC) 10 MG tablet, Take 1  "tablet by mouth Daily., Disp: 30 tablet, Rfl: 5    bumetanide (BUMEX) 1 MG tablet, 2 po daily on m,w,f.   1 po every other day of the week, Disp: 120 tablet, Rfl: 3    gabapentin (NEURONTIN) 100 MG capsule, TAKE ONE CAPSULE BY MOUTH THREE TIMES A DAY, Disp: 90 capsule, Rfl: 1    metoprolol tartrate (LOPRESSOR) 25 MG tablet, TAKE 1 TABLET BY MOUTH TWICE A DAY, Disp: 180 tablet, Rfl: 1    nabumetone (RELAFEN) 750 MG tablet, TAKE 1 TABLET BY MOUTH TWICE A DAY, Disp: 180 tablet, Rfl: 1    oxybutynin XL (DITROPAN-XL) 10 MG 24 hr tablet, TAKE 1 TABLET BY MOUTH DAILY, Disp: 90 tablet, Rfl: 0    pantoprazole (PROTONIX) 40 MG EC tablet, TAKE 1 TABLET BY MOUTH TWICE A DAY, Disp: 180 tablet, Rfl: 1    potassium chloride ER (K-TAB) 20 MEQ tablet controlled-release ER tablet, TAKE ONE TABLET BY MOUTH DAILY WITH FOOD, Disp: 90 tablet, Rfl: 1    rosuvastatin (CRESTOR) 10 MG tablet, TAKE 1 TABLET BY MOUTH DAILY, Disp: 90 tablet, Rfl: 1    tamsulosin (FLOMAX) 0.4 MG capsule 24 hr capsule, TAKE 1 CAPSULE BY MOUTH DAILY, Disp: 90 capsule, Rfl: 1    buPROPion XL (Wellbutrin XL) 150 MG 24 hr tablet, Take 1 tablet by mouth Daily., Disp: 60 tablet, Rfl: 2    Allergies:   No Known Allergies    Objective     Physical Exam:  Vital Signs:   Vitals:    07/01/24 1443   BP: 132/76   Pulse: 73   SpO2: 99%   Weight: 94.3 kg (208 lb)   Height: 165.1 cm (65\")     Body mass index is 34.61 kg/m².     Physical Exam  Vitals and nursing note reviewed.   Constitutional:       General: He is not in acute distress.     Appearance: Normal appearance. He is not ill-appearing.   HENT:      Head: Normocephalic and atraumatic.      Right Ear: Tympanic membrane and ear canal normal.      Left Ear: Tympanic membrane and ear canal normal.      Nose: Nose normal.   Cardiovascular:      Rate and Rhythm: Normal rate and regular rhythm.      Heart sounds: Normal heart sounds.   Pulmonary:      Effort: Pulmonary effort is normal.      Breath sounds: Normal breath " sounds.   Neurological:      Mental Status: He is alert and oriented to person, place, and time. Mental status is at baseline.   Psychiatric:         Mood and Affect: Mood normal.       Physical Exam      Procedures    Results    Assessment / Plan      Assessment/Plan:   Diagnoses and all orders for this visit:    1. Edema, unspecified type (Primary)    2. History of poliomyelitis    3. Other fatigue    4. Mood disorder    Other orders  -     bumetanide (BUMEX) 1 MG tablet; 2 po daily on m,w,f.   1 po every other day of the week  Dispense: 120 tablet; Refill: 3  -     buPROPion XL (Wellbutrin XL) 150 MG 24 hr tablet; Take 1 tablet by mouth Daily.  Dispense: 60 tablet; Refill: 2       Assessment & Plan  1. Lower extremity edema.  A new prescription for bumetanide has been issued, with instructions to take two tablets on Mondays, Wednesdays, and Fridays.  One tablet the other days.    2. Depression.  Wellbutrin has been prescribed, to be taken once daily.    Since his prior brace company went out of business he will consult with a new brace company for his long-leg brace.    Follow Up:   No follow-ups on file.    Carroll Malone  [facility]     Patient or patient representative verbalized consent for the use of Ambient Listening during the visit with  Carroll Malone MD for chart documentation. 7/1/2024  21:47 EDT

## 2024-07-02 RX ORDER — PANTOPRAZOLE SODIUM 40 MG/1
40 TABLET, DELAYED RELEASE ORAL 2 TIMES DAILY
Qty: 180 TABLET | Refills: 1 | Status: SHIPPED | OUTPATIENT
Start: 2024-07-02

## 2024-07-11 ENCOUNTER — OFFICE VISIT (OUTPATIENT)
Dept: FAMILY MEDICINE CLINIC | Facility: CLINIC | Age: 84
End: 2024-07-11
Payer: MEDICARE

## 2024-07-11 ENCOUNTER — TELEPHONE (OUTPATIENT)
Dept: CARDIOLOGY | Facility: CLINIC | Age: 84
End: 2024-07-11
Payer: MEDICARE

## 2024-07-11 VITALS
SYSTOLIC BLOOD PRESSURE: 126 MMHG | WEIGHT: 218.06 LBS | HEART RATE: 61 BPM | HEIGHT: 65 IN | DIASTOLIC BLOOD PRESSURE: 62 MMHG | BODY MASS INDEX: 36.33 KG/M2 | OXYGEN SATURATION: 99 %

## 2024-07-11 DIAGNOSIS — R60.9 EDEMA, UNSPECIFIED TYPE: Primary | ICD-10-CM

## 2024-07-11 PROBLEM — E66.9 OBESITY (BMI 35.0-39.9 WITHOUT COMORBIDITY): Status: ACTIVE | Noted: 2024-07-11

## 2024-07-11 LAB
BILIRUB BLD-MCNC: NEGATIVE MG/DL
CLARITY, POC: CLEAR
COLOR UR: YELLOW
EXPIRATION DATE: NORMAL
GLUCOSE UR STRIP-MCNC: NEGATIVE MG/DL
KETONES UR QL: NEGATIVE
LEUKOCYTE EST, POC: NEGATIVE
Lab: NORMAL
NITRITE UR-MCNC: NEGATIVE MG/ML
PH UR: 6 [PH] (ref 5–8)
PROT UR STRIP-MCNC: NEGATIVE MG/DL
RBC # UR STRIP: NEGATIVE /UL
SP GR UR: 1 (ref 1–1.03)
UROBILINOGEN UR QL: NORMAL

## 2024-07-11 PROCEDURE — 81003 URINALYSIS AUTO W/O SCOPE: CPT | Performed by: NURSE PRACTITIONER

## 2024-07-11 PROCEDURE — 99214 OFFICE O/P EST MOD 30 MIN: CPT | Performed by: NURSE PRACTITIONER

## 2024-07-11 PROCEDURE — 1159F MED LIST DOCD IN RCRD: CPT | Performed by: NURSE PRACTITIONER

## 2024-07-11 PROCEDURE — 1160F RVW MEDS BY RX/DR IN RCRD: CPT | Performed by: NURSE PRACTITIONER

## 2024-07-11 RX ORDER — FUROSEMIDE 40 MG/1
1 TABLET ORAL DAILY
COMMUNITY
End: 2024-07-11

## 2024-07-11 RX ORDER — OXYBUTYNIN CHLORIDE 10 MG/1
10 TABLET, EXTENDED RELEASE ORAL DAILY
Qty: 90 TABLET | Refills: 0 | Status: SHIPPED | OUTPATIENT
Start: 2024-07-11

## 2024-07-11 RX ORDER — LOSARTAN POTASSIUM 50 MG/1
1 TABLET ORAL DAILY
COMMUNITY

## 2024-07-11 NOTE — PATIENT INSTRUCTIONS
Obesity, Adult  Obesity is the condition of having too much total body fat. Being overweight or obese means that your weight is greater than what is considered healthy for your body size. Obesity is determined by a measurement called BMI (body mass index). BMI is an estimate of body fat and is calculated from height and weight. For adults, a BMI of 30 or higher is considered obese.  Obesity can lead to other health concerns and major illnesses, including:  Stroke.  Coronary artery disease (CAD).  Type 2 diabetes.  Some types of cancer, including cancers of the colon, breast, uterus, and gallbladder.  High blood pressure (hypertension).  High cholesterol.  Gallbladder stones.  Obesity can also contribute to:  Osteoarthritis.  Sleep apnea.  Infertility problems.  What are the causes?  Common causes of this condition include:  Eating daily meals that are high in calories, sugar, and fat.  Drinking high amounts of sugar-sweetened beverages, such as soft drinks.  Being born with genes that may make you more likely to become obese.  Having a medical condition that causes obesity, including:  Hypothyroidism.  Polycystic ovarian syndrome (PCOS).  Binge-eating disorder.  Cushing syndrome.  Taking certain medicines, such as steroids, antidepressants, and seizure medicines.  Not being physically active (sedentary lifestyle).  Not getting enough sleep.  What increases the risk?  The following factors may make you more likely to develop this condition:  Having a family history of obesity.  Living in an area with limited access to:  [place], recreation centers, or sidewalks.  Healthy food choices, such as grocery stores and [employer] markets.  What are the signs or symptoms?  The main sign of this condition is having too much body fat.  How is this diagnosed?  This condition is diagnosed based on:  Your BMI. If you are an adult with a BMI of 30 or higher, you are considered obese.  Your waist circumference. This measures the  distance around your waistline.  Your skinfold thickness. Your health care provider may gently pinch a fold of your skin and measure it.  You may have other tests to check for underlying conditions.  How is this treated?  Treatment for this condition often includes changing your lifestyle. Treatment may include some or all of the following:  Dietary changes. This may include developing a healthy meal plan.  Regular physical activity. This may include activity that causes your heart to beat faster (aerobic exercise) and strength training. Work with your health care provider to design an exercise program that works for you.  Medicine to help you lose weight if you are unable to lose one pound a week after six weeks of healthy eating and more physical activity.  Treating conditions that cause the obesity (underlying conditions).  Surgery. Surgical options may include gastric banding and gastric bypass. Surgery may be done if:  Other treatments have not helped to improve your condition.  You have a BMI of 40 or higher.  You have life-threatening health problems related to obesity.  Follow these instructions at home:  Eating and drinking    Follow recommendations from your health care provider about what you eat and drink. Your health care provider may advise you to:  Limit fast food, sweets, and processed snack foods.  Choose low-fat options, such as low-fat milk instead of whole milk.  Eat five or more servings of fruits or vegetables every day.  Choose healthy foods when you eat out.  Keep low-fat snacks available.  Limit sugary drinks, such as soda, fruit juice, sweetened iced tea, and flavored milk.  Drink enough water to keep your urine pale yellow.  Do not follow a fad diet. Fad diets can be unhealthy and even dangerous.  Other healthful choices include:  Eat at home more often. This gives you more control over what you eat.  Learn to read food labels. This will help you understand how much food is considered one  serving.  Learn what a healthy serving size is.  Physical activity  Exercise regularly, as told by your health care provider.  Most adults should get up to 150 minutes of moderate-intensity exercise every week.  Ask your health care provider what types of exercise are safe for you and how often you should exercise.  Warm up and stretch before being active.  Cool down and stretch after being active.  Rest between periods of activity.  Lifestyle  Work with your health care provider and a dietitian to set a weight-loss goal that is healthy and reasonable for you.  Limit your screen time.  Find ways to reward yourself that do not involve food.  Do not drink alcohol if:  Your health care provider tells you not to drink.  You are pregnant, may be pregnant, or are planning to become pregnant.  If you drink alcohol:  Limit how much you have to:  0-1 drink a day for women.  0-2 drinks a day for men.  Know how much alcohol is in your drink. In the [place] one drink equals one 12 oz bottle of beer (355 mL), one 5 oz glass of wine (148 mL), or one 1½ oz glass of hard liquor (44 mL).  General instructions  Keep a weight-loss journal to keep track of the food you eat and how much exercise you get.  Take over-the-counter and prescription medicines only as told by your health care provider.  Take vitamins and supplements only as told by your health care provider.  Consider joining a support group. Your health care provider may be able to recommend a support group.  Pay attention to your mental health as obesity can lead to depression or self esteem issues.  Keep all follow-up visits. This is important.  Contact a health care provider if:  You are unable to meet your weight-loss goal after six weeks of dietary and lifestyle changes.  You have trouble breathing.  Summary  Obesity is the condition of having too much total body fat.  Being overweight or obese means that your weight is greater than what is considered healthy for your body  size.  Work with your health care provider and a dietitian to set a weight-loss goal that is healthy and reasonable for you.  Exercise regularly, as told by your health care provider. Ask your health care provider what types of exercise are safe for you and how often you should exercise.  This information is not intended to replace advice given to you by your health care provider. Make sure you discuss any questions you have with your health care provider.  Document Revised: 07/26/2022 Document Reviewed: 07/26/2022  [employer] Patient Education © 2024 [employer]    Exercising to Lose Weight  Getting regular exercise is important for everyone. It is especially important if you are overweight. Being overweight increases your risk of heart disease, stroke, diabetes, high blood pressure, and several types of cancer. Exercising, and reducing the calories you consume, can help you lose weight and improve fitness and health.  Exercise can be moderate or vigorous intensity. To lose weight, most people need to do a certain amount of moderate or vigorous-intensity exercise each week.  How can exercise affect me?  You lose weight when you exercise enough to burn more calories than you eat. Exercise also reduces body fat and builds muscle. The more muscle you have, the more calories you burn. Exercise also:  Improves mood.  Reduces stress and tension.  Improves your overall fitness, flexibility, and endurance.  Increases bone strength.  Moderate-intensity exercise    Moderate-intensity exercise is any activity that gets you moving enough to burn at least three times more energy (calories) than if you were sitting.  Examples of moderate exercise include:  Walking a mile in 15 minutes.  Doing light yard work.  Biking at an easy pace.  Most people should get at least 150 minutes of moderate-intensity exercise a week to maintain their body weight.  Vigorous-intensity exercise  Vigorous-intensity exercise is any activity that gets  you moving enough to burn at least six times more calories than if you were sitting. When you exercise at this intensity, you should be working hard enough that you are not able to carry on a conversation.  Examples of vigorous exercise include:  Running.  Playing a team sport, such as football, basketball, and soccer.  Jumping rope.  Most people should get at least 75 minutes a week of vigorous exercise to maintain their body weight.  What actions can I take to lose weight?  The amount of exercise you need to lose weight depends on:  Your age.  The type of exercise.  Any health conditions you have.  Your overall physical ability.  Talk to your health care provider about how much exercise you need and what types of activities are safe for you.  Nutrition    Make changes to your diet as told by your health care provider or diet and nutrition specialist (dietitian). This may include:  Eating fewer calories.  Eating more protein.  Eating less unhealthy fats.  Eating a diet that includes fresh fruits and vegetables, whole grains, low-fat dairy products, and lean protein.  Avoiding foods with added fat, salt, and sugar.  Drink plenty of water while you exercise to prevent dehydration or heat stroke.  Activity  Choose an activity that you enjoy and set realistic goals. Your health care provider can help you make an exercise plan that works for you.  Exercise at a moderate or vigorous intensity most days of the week.  The intensity of exercise may vary from person to person. You can tell how intense a workout is for you by paying attention to your breathing and heartbeat. Most people will notice their breathing and heartbeat get faster with more intense exercise.  Do resistance training twice each week, such as:  Push-ups.  Sit-ups.  Lifting weights.  Using resistance bands.  Getting short amounts of exercise can be just as helpful as long, structured periods of exercise. If you have trouble finding time to exercise, try  doing these things as part of your daily routine:  Get up, stretch, and walk around every 30 minutes throughout the day.  Go for a walk during your lunch break.  Park your car farther away from your destination.  If you take public transportation, get off one stop early and walk the rest of the way.  Make phone calls while standing up and walking around.  Take the stairs instead of elevators or escalators.  Wear comfortable clothes and shoes with good support.  Do not exercise so much that you hurt yourself, feel dizzy, or get very short of breath.  Where to find more information  [place] Department of Health and Human Services: www.hhs.gov  Centers for Disease Control and Prevention: www.cdc.gov  Contact a health care provider:  Before starting a new exercise program.  If you have questions or concerns about your weight.  If you have a medical problem that keeps you from exercising.  Get help right away if:  You have any of the following while exercising:  Injury.  Dizziness.  Difficulty breathing or shortness of breath that does not go away when you stop exercising.  Chest pain.  Rapid heartbeat.  These symptoms may represent a serious problem that is an emergency. Do not wait to see if the symptoms will go away. Get medical help right away. Call your local emergency services (911 in the [place] Do not drive yourself to the hospital.  Summary  Getting regular exercise is especially important if you are overweight.  Being overweight increases your risk of heart disease, stroke, diabetes, high blood pressure, and several types of cancer.  Losing weight happens when you burn more calories than you eat.  Reducing the amount of calories you eat, and getting regular moderate or vigorous exercise each week, helps you lose weight.  This information is not intended to replace advice given to you by your health care provider. Make sure you discuss any questions you have with your health care provider.  Document Revised:  02/13/2022 Document Reviewed: 02/13/2022  [employer] Patient Education © 2024 [employer]    MyPlate from [employer]    MyPlate is an outline of a general healthy diet based on the Dietary Guidelines for Americans, 4773-6778, from the [place]. Department of Agriculture (USDA). It sets guidelines for how much food you should eat from each food group based on your age, sex, and level of physical activity.  What are tips for following MyPlate?  To follow MyPlate recommendations:  Eat a wide variety of fruits and vegetables, grains, and protein foods.  Serve smaller portions and eat less food throughout the day.  Limit portion sizes to avoid overeating.  Enjoy your food.  Get at least 150 minutes of exercise every week. This is about 30 minutes each day, 5 or more days per week.  It can be difficult to have every meal look like MyPlate. Think about MyPlate as eating guidelines for an entire day, rather than each individual meal.  Fruits and vegetables  Make one half of your plate fruits and vegetables.  Eat many different colors of fruits and vegetables each day.  For a 2,000-calorie daily food plan, eat:  2½ cups of vegetables every day.  2 cups of fruit every day.  1 cup is equal to:  1 cup raw or cooked vegetables.  1 cup raw fruit.  1 medium-sized orange, apple, or banana.  1 cup 100% fruit or vegetable juice.  2 cups raw leafy greens, such as lettuce, spinach, or kale.  ½ cup dried fruit.  Grains  One fourth of your plate should be grains.  Make at least half of the grains you eat each day whole grains.  For a 2,000-calorie daily food plan, eat 6 oz of grains every day.  1 oz is equal to:  1 slice bread.  1 cup cereal.  ½ cup cooked rice, cereal, or pasta.  Protein  One fourth of your plate should be protein.  Eat a wide variety of protein foods, including meat, poultry, fish, eggs, beans, nuts, and tofu.  For a 2,000-calorie daily food plan, eat 5½ oz of protein every day.  1 oz is equal to:  1 oz meat, poultry, or fish.  ¼  cup cooked beans.  1 egg.  ½ oz nuts or seeds.  1 Tbsp peanut butter.  Dairy  Drink fat-free or low-fat (1%) milk.  Eat or drink dairy as a side to meals.  For a 2,000-calorie daily food plan, eat or drink 3 cups of dairy every day.  1 cup is equal to:  1 cup milk, yogurt, cottage cheese, or soy milk (soy beverage).  2 oz processed cheese.  1½ oz natural cheese.  Fats, oils, salt, and sugars  Only small amounts of oils are recommended.  Avoid foods that are high in calories and low in nutritional value (empty calories), like foods high in fat or added sugars.  Choose foods that are low in salt (sodium). Choose foods that have less than 140 milligrams (mg) of sodium per serving.  Drink water instead of sugary drinks. Drink enough fluid to keep your urine pale yellow.  Where to find support  Work with your health care provider or a dietitian to develop a customized eating plan that is right for you.  Download an mandy (mobile application) to help you track your daily food intake.  Where to find more information  USDA: ChooseMyPlate.gov  Summary  MyPlate is a general guideline for healthy eating from the USDA. It is based on the Dietary Guidelines for Americans, 7776-1350.  In general, fruits and vegetables should take up one half of your plate, grains should take up one fourth of your plate, and protein should take up one fourth of your plate.  This information is not intended to replace advice given to you by your health care provider. Make sure you discuss any questions you have with your health care provider.  Document Revised: 11/08/2021 Document Reviewed: 11/08/2021  Elsevier Patient Education © 2024 [employer] Inc.

## 2024-07-11 NOTE — ASSESSMENT & PLAN NOTE
Labs drawn.  UA completed.  We went over all of his current medications.  I informed him that he was to increase his Bumex to 2 tablets Monday Wednesday Friday and 1 tablet every other day.  I wrote this down on a piece of paper for him and gave it to him so that he does not forget.  Patient does agree to cardiology referral so I will get that placed.  Compression stockings and elevation of legs encouraged.  Proper diet and exercise plan discussed and encouraged.  We discussed other things such as cardiac issues and blood clots and he denies further evaluation with things such as EKGs and ultrasounds and just wants to wait and try the medication and see cardiology.  He understands the risks of not doing these things.  Risk of meds discussed understood.  Education provided.  He knows that if anything worsens at all or if he develops any new signs symptoms to immediately go to the hospital.  Return to clinic or ED with any issues or concerns.

## 2024-07-11 NOTE — PROGRESS NOTES
"Chief Complaint  Leg Swelling    Subjective          Carroll Frost presents to [facility]  History of Present Illness    Patient presents with continued concern for bilateral leg swelling.  States has been going on for months.  No redness no warmth.  No injuries.  He has been speaking with his PCP Dr. Malone about this in the past.  7/1/2024 he saw Dr. Malone for this and Dr. Malone recommended that he increase his Bumex to 1 mg Tuesday Thursday Saturday Sunday and 2 mg on Monday Wednesday Friday.  Patient states he has not been doing this and was unaware and states he is only been taking 1 mg Bumex daily.  No fever no chills no body aches.  No dizziness no headache no chest pain no chest pressure no shortness of breath no trouble breathing no urinary or bowel issues.    Objective   Vital Signs:   /62   Pulse 61   Ht 165.1 cm (65\")   Wt 98.9 kg (218 lb 1 oz)   SpO2 99%   BMI 36.29 kg/m²     Body mass index is 36.29 kg/m².    Review of Systems   Constitutional:  Negative for chills, fatigue, fever, unexpected weight gain and unexpected weight loss.   Eyes:  Negative for visual disturbance.   Respiratory:  Negative for cough, chest tightness, shortness of breath and wheezing.    Cardiovascular:  Positive for leg swelling. Negative for chest pain and palpitations.   Gastrointestinal:  Negative for abdominal pain, diarrhea, nausea and vomiting.   Genitourinary:  Negative for decreased urine volume, dysuria, frequency, hematuria and urgency.   Musculoskeletal:  Negative for back pain.   Skin:  Negative for color change, rash, skin lesions and wound.   Neurological:  Negative for dizziness, weakness, light-headedness, headache and confusion.       Past History:  Medical History: has a past medical history of Acute midline low back pain without sciatica, BPH (benign prostatic hyperplasia), Chronic bilateral low back pain without sciatica, Chronic sinusitis, Class 1 obesity " due to excess calories with serious comorbidity and body mass index (BMI) of 30.0 to 30.9 in adult, Cluster headaches, Essential hypertension, Frequent falls, Glaucoma, H/O oral aphthous ulcers, Hemorrhoids, History of poliomyelitis, Hyperlipidemia, Hypertension, Insomnia, Megaloblastic anemia, Mild episode of recurrent major depressive disorder, Mixed hyperlipidemia, Neuropathy, Obstructive sleep apnea syndrome, Polio, PUD (peptic ulcer disease), and Shoulder arthritis.   Surgical History: has no past surgical history on file.   Family History: family history includes Asthma in his mother; Hypertension in his father.   Social History: reports that he has never smoked. He has never used smokeless tobacco. He reports that he does not currently use alcohol. Drug use questions deferred to the physician.    PHQ-2 Depression Screening  Little interest or pleasure in doing things?     Feeling down, depressed, or hopeless?     PHQ-2 Total Score          PHQ-9 Depression Screening  Little interest or pleasure in doing things?     Feeling down, depressed, or hopeless?     Trouble falling or staying asleep, or sleeping too much?     Feeling tired or having little energy?     Poor appetite or overeating?     Feeling bad about yourself - or that you are a failure or have let yourself or your family down?     Trouble concentrating on things, such as reading the newspaper or watching television?     Moving or speaking so slowly that other people could have noticed? Or the opposite - being so fidgety or restless that you have been moving around a lot more than usual?     Thoughts that you would be better off dead, or of hurting yourself in some way?     PHQ-9 Total Score     If you checked off any problems, how difficult have these problems made it for you to do your work, take care of things at home, or get along with other people?       PHQ-9 Total Score:        Patient screened positive for depression based on a PHQ-9 score of  0 on 1/3/2024. Follow-up recommendations include:          Current Outpatient Medications:     bumetanide (BUMEX) 1 MG tablet, 2 po daily on m,w,f.   1 po every other day of the week, Disp: 120 tablet, Rfl: 3    buPROPion XL (Wellbutrin XL) 150 MG 24 hr tablet, Take 1 tablet by mouth Daily., Disp: 60 tablet, Rfl: 2    gabapentin (NEURONTIN) 100 MG capsule, TAKE ONE CAPSULE BY MOUTH THREE TIMES A DAY, Disp: 90 capsule, Rfl: 1    losartan (COZAAR) 50 MG tablet, Take 1 tablet by mouth Daily., Disp: , Rfl:     metoprolol tartrate (LOPRESSOR) 25 MG tablet, TAKE 1 TABLET BY MOUTH TWICE A DAY, Disp: 180 tablet, Rfl: 1    nabumetone (RELAFEN) 750 MG tablet, TAKE 1 TABLET BY MOUTH TWICE A DAY, Disp: 180 tablet, Rfl: 1    oxybutynin XL (DITROPAN-XL) 10 MG 24 hr tablet, TAKE 1 TABLET BY MOUTH DAILY, Disp: 90 tablet, Rfl: 0    pantoprazole (PROTONIX) 40 MG EC tablet, TAKE 1 TABLET BY MOUTH TWICE A DAY, Disp: 180 tablet, Rfl: 1    potassium chloride ER (K-TAB) 20 MEQ tablet controlled-release ER tablet, TAKE ONE TABLET BY MOUTH DAILY WITH FOOD, Disp: 90 tablet, Rfl: 1    rosuvastatin (CRESTOR) 10 MG tablet, TAKE 1 TABLET BY MOUTH DAILY, Disp: 90 tablet, Rfl: 1    tamsulosin (FLOMAX) 0.4 MG capsule 24 hr capsule, TAKE 1 CAPSULE BY MOUTH DAILY, Disp: 90 capsule, Rfl: 1    amLODIPine (NORVASC) 10 MG tablet, Take 1 tablet by mouth Daily., Disp: 30 tablet, Rfl: 5   (Not in a hospital admission)     Allergies: Patient has no known allergies.    Physical Exam  Constitutional:       Appearance: Normal appearance.   Cardiovascular:      Rate and Rhythm: Normal rate and regular rhythm.      Heart sounds: Normal heart sounds.   Pulmonary:      Effort: Pulmonary effort is normal. No respiratory distress.      Breath sounds: Normal breath sounds. No wheezing, rhonchi or rales.   Musculoskeletal:      Right lower leg: No tenderness or bony tenderness. 2+ Pitting Edema present.      Left lower leg: No tenderness or bony tenderness. 2+ Pitting  Edema present.   Skin:     Findings: No rash.   Neurological:      General: No focal deficit present.      Mental Status: He is alert and oriented to person, place, and time. Mental status is at baseline.   Psychiatric:         Mood and Affect: Mood normal.         Behavior: Behavior normal.         Thought Content: Thought content normal.         Judgment: Judgment normal.          Result Review :                   Assessment and Plan    Diagnoses and all orders for this visit:    1. Edema, unspecified type (Primary)  Assessment & Plan:  Labs drawn.  UA completed.  We went over all of his current medications.  I informed him that he was to increase his Bumex to 2 tablets Monday Wednesday Friday and 1 tablet every other day.  I wrote this down on a piece of paper for him and gave it to him so that he does not forget.  Patient does agree to cardiology referral so I will get that placed.  Compression stockings and elevation of legs encouraged.  Proper diet and exercise plan discussed and encouraged.  We discussed other things such as cardiac issues and blood clots and he denies further evaluation with things such as EKGs and ultrasounds and just wants to wait and try the medication and see cardiology.  He understands the risks of not doing these things.  Risk of meds discussed understood.  Education provided.  He knows that if anything worsens at all or if he develops any new signs symptoms to immediately go to the hospital.  Return to clinic or ED with any issues or concerns.    Orders:  -     Ambulatory Referral to Cardiology  -     CBC & Differential  -     Comprehensive Metabolic Panel  -     POC Urinalysis Dipstick, Automated; Future              Class 2 Severe Obesity (BMI >=35 and <=39.9). Obesity-related health conditions include the following: hypertension and dyslipidemias. Obesity is improving with treatment. BMI is is above average; BMI management plan is completed. We discussed portion control and  increasing exercise.       Follow Up   Return for Keep appointment with Dr. nixon on 9/24/24.  Patient was given instructions and counseling regarding his condition or for health maintenance advice. Please see specific information pulled into the AVS if appropriate.     ALICIA Hurst

## 2024-07-12 LAB
ALBUMIN SERPL-MCNC: 3.9 G/DL (ref 3.7–4.7)
ALP SERPL-CCNC: 77 IU/L (ref 44–121)
ALT SERPL-CCNC: 17 IU/L (ref 0–44)
AST SERPL-CCNC: 18 IU/L (ref 0–40)
BASOPHILS # BLD AUTO: 0.1 X10E3/UL (ref 0–0.2)
BASOPHILS NFR BLD AUTO: 1 %
BILIRUB SERPL-MCNC: 0.3 MG/DL (ref 0–1.2)
BUN SERPL-MCNC: 11 MG/DL (ref 8–27)
BUN/CREAT SERPL: 9 (ref 10–24)
CALCIUM SERPL-MCNC: 9.2 MG/DL (ref 8.6–10.2)
CHLORIDE SERPL-SCNC: 103 MMOL/L (ref 96–106)
CO2 SERPL-SCNC: 21 MMOL/L (ref 20–29)
CREAT SERPL-MCNC: 1.29 MG/DL (ref 0.76–1.27)
EGFRCR SERPLBLD CKD-EPI 2021: 54 ML/MIN/1.73
EOSINOPHIL # BLD AUTO: 0.4 X10E3/UL (ref 0–0.4)
EOSINOPHIL NFR BLD AUTO: 6 %
ERYTHROCYTE [DISTWIDTH] IN BLOOD BY AUTOMATED COUNT: 13.9 % (ref 11.6–15.4)
GLOBULIN SER CALC-MCNC: 2.6 G/DL (ref 1.5–4.5)
GLUCOSE SERPL-MCNC: 83 MG/DL (ref 70–99)
HCT VFR BLD AUTO: 36.1 % (ref 37.5–51)
HGB BLD-MCNC: 11.9 G/DL (ref 13–17.7)
IMM GRANULOCYTES # BLD AUTO: 0 X10E3/UL (ref 0–0.1)
IMM GRANULOCYTES NFR BLD AUTO: 0 %
LYMPHOCYTES # BLD AUTO: 1.3 X10E3/UL (ref 0.7–3.1)
LYMPHOCYTES NFR BLD AUTO: 21 %
MCH RBC QN AUTO: 29.9 PG (ref 26.6–33)
MCHC RBC AUTO-ENTMCNC: 33 G/DL (ref 31.5–35.7)
MCV RBC AUTO: 91 FL (ref 79–97)
MONOCYTES # BLD AUTO: 0.6 X10E3/UL (ref 0.1–0.9)
MONOCYTES NFR BLD AUTO: 9 %
NEUTROPHILS # BLD AUTO: 3.9 X10E3/UL (ref 1.4–7)
NEUTROPHILS NFR BLD AUTO: 63 %
PLATELET # BLD AUTO: 121 X10E3/UL (ref 150–450)
POTASSIUM SERPL-SCNC: 4.5 MMOL/L (ref 3.5–5.2)
PROT SERPL-MCNC: 6.5 G/DL (ref 6–8.5)
RBC # BLD AUTO: 3.98 X10E6/UL (ref 4.14–5.8)
SODIUM SERPL-SCNC: 138 MMOL/L (ref 134–144)
WBC # BLD AUTO: 6.2 X10E3/UL (ref 3.4–10.8)

## 2024-07-16 ENCOUNTER — TELEPHONE (OUTPATIENT)
Dept: CARDIOLOGY | Facility: CLINIC | Age: 84
End: 2024-07-16
Payer: MEDICARE

## 2024-07-16 NOTE — TELEPHONE ENCOUNTER
LVM FOR PT TO CALL BACK AND SCHEDULE REFERRAL     [facility] CAN SCHEDULE EARLIEST AVAILABLE APPOINTMENT

## 2024-07-23 ENCOUNTER — TELEPHONE (OUTPATIENT)
Dept: CARDIOLOGY | Facility: CLINIC | Age: 84
End: 2024-07-23
Payer: MEDICARE

## 2024-07-23 NOTE — TELEPHONE ENCOUNTER
LVM FOR PT TO CALL BACK AND SCHEDULE REFERRAL     [facility] CAN SCHEDULE EARLIEST AVAILABLE APPOINTMENT     TRIED CALLING RELATIVE AS WELL- COULDN'T LVM

## 2024-07-24 ENCOUNTER — OFFICE VISIT (OUTPATIENT)
Dept: CARDIOLOGY | Facility: CLINIC | Age: 84
End: 2024-07-24
Payer: MEDICARE

## 2024-07-24 VITALS
WEIGHT: 206.8 LBS | OXYGEN SATURATION: 99 % | RESPIRATION RATE: 16 BRPM | SYSTOLIC BLOOD PRESSURE: 132 MMHG | HEIGHT: 65 IN | DIASTOLIC BLOOD PRESSURE: 84 MMHG | BODY MASS INDEX: 34.45 KG/M2 | HEART RATE: 66 BPM

## 2024-07-24 DIAGNOSIS — R60.0 EDEMA OF BOTH LEGS: Primary | ICD-10-CM

## 2024-07-24 DIAGNOSIS — E78.2 MIXED HYPERLIPIDEMIA: ICD-10-CM

## 2024-07-24 DIAGNOSIS — I10 ESSENTIAL HYPERTENSION: ICD-10-CM

## 2024-07-24 PROCEDURE — 3075F SYST BP GE 130 - 139MM HG: CPT | Performed by: INTERNAL MEDICINE

## 2024-07-24 PROCEDURE — 99204 OFFICE O/P NEW MOD 45 MIN: CPT | Performed by: INTERNAL MEDICINE

## 2024-07-24 PROCEDURE — 3079F DIAST BP 80-89 MM HG: CPT | Performed by: INTERNAL MEDICINE

## 2024-07-24 PROCEDURE — 1159F MED LIST DOCD IN RCRD: CPT | Performed by: INTERNAL MEDICINE

## 2024-07-24 PROCEDURE — 1160F RVW MEDS BY RX/DR IN RCRD: CPT | Performed by: INTERNAL MEDICINE

## 2024-07-24 PROCEDURE — 93000 ELECTROCARDIOGRAM COMPLETE: CPT | Performed by: INTERNAL MEDICINE

## 2024-07-24 RX ORDER — BUMETANIDE 1 MG/1
1 TABLET ORAL DAILY
Qty: 120 TABLET | Refills: 3 | Status: SHIPPED | OUTPATIENT
Start: 2024-07-24

## 2024-07-24 RX ORDER — AMLODIPINE BESYLATE 10 MG/1
5 TABLET ORAL DAILY
Qty: 30 TABLET | Refills: 5 | Status: SHIPPED | OUTPATIENT
Start: 2024-07-24

## 2024-07-24 NOTE — ASSESSMENT & PLAN NOTE
"Hypertension is stable and controlled  Continue current treatment regimen.  Medication changes per orders.  Dietary sodium restriction.  Weight loss.  Regular aerobic exercise.  Ambulatory blood pressure monitoring.  Changes as per \"edema of both legs\" problem list  Blood pressure will be reassessed in 1 month.  "

## 2024-07-24 NOTE — PATIENT INSTRUCTIONS
[facility]  [facility] CARDIOLOGY  [address] [place] [address]  Dept: [phone]  Dept Fax: [phone]    Date:   Patient: Carroll Frost    Blood Pressure Log  Provided By: Remigio Cadena MD    Date Blood Pressure Heart Rate Comments   Thursday July 25, 2024       Friday July 26, 2024       Saturday July 27, 2024       Oseas July 28, 2024       Monday July 29, 2024       Tuesday July 30, 2024       Wednesday July 31, 2024       Thursday August 1, 2024       Friday August 2, 2024       Saturday August 3, 2024       Oseas August 4, 2024       Monday August 5, 2024       Tuesday August 6, 2024       Wednesday August 7, 2024       Thursday August 8, 2024       Friday August 9, 2024       Saturday August 10, 2024       [place]se[place] August 11, 2024       Monday August 12, 2024       Tuesday August 13, 2024       Wednesday August 14, 2024       Thursday August 15, 2024       Friday August 16, 2024       Saturday August 17, 2024       Oseas August 18, 2024       Monday August 19, 2024       Tuesday August 20, 2024       Wednesday August 21, 2024       Thursday August 22, 2024       Friday August 23, 2024       Saturday August 24, 2024       Oseas August 25, 2024       Monday August 26, 2024       Tuesday August 27, 2024       Wednesday August 28, 2024       Thursday August 29, 2024       Friday August 30, 2024       Saturday August 31, 2024       Oseas September 1, 2024       Monday September 2, 2024       Tuesday September 3, 2024       Wednesday September 4, 2024       Thursday September 5, 2024       Friday September 6, 2024       Saturday September 7, 2024       Oseas September 8, 2024       Monday September 9, 2024       Tuesday September 10, 2024       Wednesday September 11, 2024       Thursday September 12, 2024       Friday September 13, 2024       Saturday September 14, 2024       Oseas September 15, 2024       Monday September 16, 2024       Tuesday September 17, 2024        Wednesday September 18, 2024 Thursday September 19, 2024 Friday September 20, 2024 Saturday September 21, 2024

## 2024-07-24 NOTE — PROGRESS NOTES
With [facility] [place]  [facility] CARDIOLOGY  [address] [place] [address]  Dept: [phone]  Dept Fax: [phone]    Date: 07/24/2024  Patient: Carroll Frost  YOB: 1940    New Patient Office Note    Consult Reason:  Mr. Carroll Forst is a 83 y.o. male who presents to the clinic to establish care, seen for Edema.   Patient complaining of bilateral lower extremity edema worse since started amlodipine 10 mg daily.  Otherwise, patient doing well with no complaints.  Patient denies angina, orthopnea, PND, palpitations, lightheadedness, syncope or medications side-effects.  Exercise capacity age-appropriate.      The following portions of the patient's history were reviewed and updated as appropriate: allergies, current medications, past family history, past medical history, past social history, past surgical history, and problem list.    Medications: No Known Allergies   Current Outpatient Medications   Medication Instructions    amLODIPine (NORVASC) 5 mg, Oral, Daily    bumetanide (BUMEX) 1 mg, Oral, Daily, 1 additional tab as needed for worsening of swelling    buPROPion XL (WELLBUTRIN XL) 150 mg, Oral, Daily    gabapentin (NEURONTIN) 100 mg, Oral, 3 Times Daily    losartan (COZAAR) 100 mg, Oral, Daily    metoprolol tartrate (LOPRESSOR) 25 mg, Oral, 2 Times Daily    nabumetone (RELAFEN) 750 mg, Oral, 2 Times Daily    oxybutynin XL (DITROPAN-XL) 10 mg, Oral, Daily    pantoprazole (PROTONIX) 40 mg, Oral, 2 Times Daily    potassium chloride ER (K-TAB) 20 MEQ tablet controlled-release ER tablet TAKE ONE TABLET BY MOUTH DAILY WITH FOOD    rosuvastatin (CRESTOR) 10 mg, Oral, Daily    tamsulosin (FLOMAX) 0.4 mg, Oral, Daily       Subjective  Past Medical History:   Diagnosis Date    Acute midline low back pain without sciatica     BPH (benign prostatic hyperplasia)     Chronic bilateral low back pain without sciatica     Chronic sinusitis     hx chronic sinusitis and  "nasal polyps    Class 1 obesity due to excess calories with serious comorbidity and body mass index (BMI) of 30.0 to 30.9 in adult     Cluster headaches     resolved as he got older    Essential hypertension     Frequent falls     Glaucoma     H/O oral aphthous ulcers     Recurrent apthous ulcers    Hemorrhoids     hx of    History of poliomyelitis     Hyperlipidemia     meds    Hypertension     meds    Insomnia     Megaloblastic anemia     hx of    Mild episode of recurrent major depressive disorder     Mixed hyperlipidemia     Neuropathy     Obstructive sleep apnea syndrome     CPAP, not successful using so far    Polio     hx polio    PUD (peptic ulcer disease)     hx PUD with H pylori - s/p antibiotic tx for H pylori    Shoulder arthritis        History reviewed. No pertinent surgical history.    Family History   Problem Relation Age of Onset    Asthma Mother     Hypertension Father         Social History     Socioeconomic History    Marital status:    Tobacco Use    Smoking status: Never    Smokeless tobacco: Never    Tobacco comments:     Tobacco/Vaping use   Vaping Use    Vaping status: Never Used   Substance and Sexual Activity    Alcohol use: Not Currently    Drug use: Defer    Sexual activity: Defer       Objective  Vitals:    07/24/24 1029   BP: 132/84   BP Location: Right arm   Patient Position: Sitting   Pulse: 66   Resp: 16   SpO2: 99%   Weight: 93.8 kg (206 lb 12.8 oz)   Height: 165.1 cm (65\")   PainSc:   5   PainLoc: Generalized     Vitals:    07/24/24 1029   BP: 132/84   BP Location: Right arm   Patient Position: Sitting   Pulse: 66   Resp: 16   SpO2: 99%   Weight: 93.8 kg (206 lb 12.8 oz)   Height: 165.1 cm (65\")        Physical Exam  Constitutional:       Appearance: Healthy appearance. Not in distress.   Eyes:      Pupils: Pupils are equal, round, and reactive to light.   HENT:    Mouth/Throat:      Mouth: Mucous membranes are moist.   Neck:      Vascular: No carotid bruit, hepatojugular " "reflux, JVD or JVR. JVD normal.   Pulmonary:      Effort: Pulmonary effort is normal.      Breath sounds: Normal breath sounds. No wheezing. No rhonchi. No rales.   Chest:      Chest wall: Not tender to palpatation.   Cardiovascular:      PMI at left midclavicular line. Normal rate. Regular rhythm. Normal S1 with normal intensity. Normal S2 with normal intensity.       Murmurs: There is no murmur.      No gallop.  No click. No rub.      Comments: 2+ bilateral lower extremity edema, partially pitting  Pulses:     Carotid: 4+ bilaterally.     Radial: 4+ bilaterally.     Popliteal: 4+ bilaterally.     Dorsalis pedis: 4+ bilaterally.  Edema:     Peripheral edema present.     Thigh: bilateral 2+ edema of the thigh.     Pretibial: bilateral 2+ edema of the pretibial area.     Ankle: bilateral 2+ edema of the ankle.     Feet: bilateral 2+ edema of the feet.  Abdominal:      General: There is no abdominal bruit.   Skin:     General: Skin is warm.   Neurological:      Mental Status: Alert and oriented to person, place and time.              Labs:  Lab Results   Component Value Date     07/11/2024    K 4.5 07/11/2024     07/11/2024    CO2 21 07/11/2024    BUN 11 07/11/2024    CREATININE 1.29 (H) 07/11/2024    CALCIUM 9.2 07/11/2024    BILITOT 0.3 07/11/2024    ALKPHOS 77 07/11/2024    ALT 17 07/11/2024    AST 18 07/11/2024    GLUCOSE 83 07/11/2024    ALBUMIN 3.9 07/11/2024     Lab Results   Component Value Date    WBC 6.2 07/11/2024    HGB 11.9 (L) 07/11/2024    HCT 36.1 (L) 07/11/2024     (L) 07/11/2024     No results found for: \"APTT\", \"INR\", \"PTT\"  No results found for: \"CKTOTAL\", \"TROPONINI\", \"TROPONINT\", \"CKMBINDEX\", \"BNP\"  No results found for: \"BNP\", \"PROBNP\"    Lab Results   Component Value Date    CHLPL 125 05/09/2024    TRIG 76 05/09/2024    HDL 55 05/09/2024    LDL 55 05/09/2024     Lab Results   Component Value Date    TSH 2.120 05/09/2024       The ASCVD Risk score (Carlos FINN, et al., 2019) " failed to calculate for the following reasons:    The 2019 ASCVD risk score is only valid for ages 40 to 79     [facility]:    ECG 12 Lead    Date/Time: 7/24/2024 11:01 AM  Performed by: Remigio Cadena MD    Authorized by: Remigio Cadena MD  Comparison: compared with previous ECG from 5/5/2019  Comparison to previous ECG: Low voltages not identified on current EKG  Rhythm: sinus rhythm    Clinical impression: normal ECG  Comments: Normal sinus rhythm, borderline voltages          CXR: No results found for this or any previous visit.     ECHO/MUGA: No results found for this or any previous visit.     STRESS TESTS: No results found for this or any previous visit.     CARDIAC CATH: No results found for this or any previous visit.     DEVICES: No valid procedures specified.   HOLTER: No results found for this or any previous visit.     CT/MRI:  No results found for this or any previous visit.    VASCULAR: No valid procedures specified.     Assessment and Plan  Diagnoses and all orders for this visit:    1. Edema of both legs (Primary)  Assessment & Plan:  Multifactorial.  Age-related poor circulation, possible CHF, high salt diet, high-dose amlodipine, other.  No associated shortness of breath or chest pain. Plan:  Transthoracic echocardiogram rule out CHF/hypertensive heart disease  Low-salt diet counseling - reading labels, avoiding processed meats and cheeses, avoiding pops and sodas  Encourage compression stockings and leg elevation as discussed  Decrease amlodipine 5 mg p.o. daily and uptitrate losartan to 100 mg p.o. daily  Change bumetanide to 1 mg daily, additional tablet as needed for worsening of the swelling to avoid kidney injury  Plan next visit to change potassium chloride to spironolactone for added diuresis, metoprolol to carvedilol for improved blood pressure control, as well as labs    Orders:  -     Adult Transthoracic Echo Complete W/ Cont if Necessary Per Protocol; Future    2. Mixed  "hyperlipidemia  Assessment & Plan:   Lipid abnormalities are improving with treatment    Plan:  Continue same medication/s without change.      Discussed medication dosage, use, side effects, and goals of treatment in detail.    Counseled patient on lifestyle modifications to help control hyperlipidemia.   Cholesterol lowering dietary information shared with patient.  Advised patient to exercise for 150 minutes weekly. (30 minute brisk walk, 5 days a week for example)  Weight Loss encouraged  Lab Results   Component Value Date    LDL 55 05/09/2024    LDL 54 08/04/2023    LDL 75 01/27/2023    HDL 55 05/09/2024    HDL 55 08/04/2023    HDL 59 01/27/2023    CHLPL 125 05/09/2024    CHLPL 133 08/04/2023    CHLPL 150 01/27/2023    TRIG 76 05/09/2024    TRIG 138 08/04/2023    TRIG 82 01/27/2023       Patient Treatment Goals:   LDL goal is less than 70, to target    Followup in 6 months.      3. Essential hypertension  Assessment & Plan:  Hypertension is stable and controlled  Continue current treatment regimen.  Medication changes per orders.  Dietary sodium restriction.  Weight loss.  Regular aerobic exercise.  Ambulatory blood pressure monitoring.  Changes as per \"edema of both legs\" problem list  Blood pressure will be reassessed in 1 month.      Other orders  -     ECG 12 Lead  -     amLODIPine (NORVASC) 10 MG tablet; Take 0.5 tablets by mouth Daily.  Dispense: 30 tablet; Refill: 5  -     bumetanide (BUMEX) 1 MG tablet; Take 1 tablet by mouth Daily. 1 additional tab as needed for worsening of swelling  Dispense: 120 tablet; Refill: 3         Return in about 1 month (around 8/24/2024) for Follow-up with Dr Cadena.    Patient Instructions   [facility] [place]  [facility] CARDIOLOGY  [address] [place] [address]  Dept: [phone]  Dept Fax: [phone]    Date:   Patient: Carroll Frost    Blood Pressure Log  Provided By: Remigio Cadena MD    Date Blood Pressure Heart Rate Comments "   Thursday July 25, 2024       Friday July 26, 2024       Saturday July 27, 2024       Oseas July 28, 2024       Monday July 29, 2024       Tuesday July 30, 2024       Wednesday July 31, 2024       Thursday August 1, 2024       Friday August 2, 2024       Saturday August 3, 2024       Oseas August 4, 2024       Monday August 5, 2024       Tuesday August 6, 2024       Wednesday August 7, 2024       Thursday August 8, 2024       Friday August 9, 2024       Saturday August 10, 2024       Oseas August 11, 2024       Monday August 12, 2024       Tuesday August 13, 2024       Wednesday August 14, 2024       Thursday August 15, 2024       Friday August 16, 2024       Saturday August 17, 2024       Oseas August 18, 2024       Monday August 19, 2024       Tuesday August 20, 2024       Wednesday August 21, 2024       Thursday August 22, 2024       Friday August 23, 2024       Saturday August 24, 2024       Oseas August 25, 2024       Monday August 26, 2024       Tuesday August 27, 2024       Wednesday August 28, 2024       Thursday August 29, 2024       Friday August 30, 2024       Saturday August 31, 2024       [place]se[place] September 1, 2024       Monday September 2, 2024       Tuesday September 3, 2024       Wednesday September 4, 2024       Thursday September 5, 2024       Friday September 6, 2024       Saturday September 7, 2024       Oseas September 8, 2024       Monday September 9, 2024       Tuesday September 10, 2024       Wednesday September 11, 2024       Thursday September 12, 2024       Friday September 13, 2024       Saturday September 14, 2024       Oseas September 15, 2024       Monday September 16, 2024       Tuesday September 17, 2024       Wednesday September 18, 2024       Thursday September 19, 2024       Friday September 20, 2024       Saturday September 21, 2024            Remigio Cadena MD

## 2024-07-24 NOTE — ASSESSMENT & PLAN NOTE
Multifactorial.  Age-related poor circulation, possible CHF, high salt diet, high-dose amlodipine, other.  No associated shortness of breath or chest pain. Plan:  Transthoracic echocardiogram rule out CHF/hypertensive heart disease  Low-salt diet counseling - reading labels, avoiding processed meats and cheeses, avoiding pops and sodas  Encourage compression stockings and leg elevation as discussed  Decrease amlodipine 5 mg p.o. daily and uptitrate losartan to 100 mg p.o. daily  Change bumetanide to 1 mg daily, additional tablet as needed for worsening of the swelling to avoid kidney injury  Plan next visit to change potassium chloride to spironolactone for added diuresis, metoprolol to carvedilol for improved blood pressure control, as well as labs

## 2024-07-24 NOTE — ASSESSMENT & PLAN NOTE
Lipid abnormalities are improving with treatment    Plan:  Continue same medication/s without change.      Discussed medication dosage, use, side effects, and goals of treatment in detail.    Counseled patient on lifestyle modifications to help control hyperlipidemia.   Cholesterol lowering dietary information shared with patient.  Advised patient to exercise for 150 minutes weekly. (30 minute brisk walk, 5 days a week for example)  Weight Loss encouraged  Lab Results   Component Value Date    LDL 55 05/09/2024    LDL 54 08/04/2023    LDL 75 01/27/2023    HDL 55 05/09/2024    HDL 55 08/04/2023    HDL 59 01/27/2023    CHLPL 125 05/09/2024    CHLPL 133 08/04/2023    CHLPL 150 01/27/2023    TRIG 76 05/09/2024    TRIG 138 08/04/2023    TRIG 82 01/27/2023       Patient Treatment Goals:   LDL goal is less than 70, to target    Followup in 6 months.

## 2024-08-15 RX ORDER — TAMSULOSIN HYDROCHLORIDE 0.4 MG/1
1 CAPSULE ORAL DAILY
Qty: 90 CAPSULE | Refills: 1 | Status: SHIPPED | OUTPATIENT
Start: 2024-08-15

## 2024-08-16 ENCOUNTER — OFFICE VISIT (OUTPATIENT)
Dept: FAMILY MEDICINE CLINIC | Facility: CLINIC | Age: 84
End: 2024-08-16
Payer: MEDICARE

## 2024-08-16 VITALS — OXYGEN SATURATION: 99 % | DIASTOLIC BLOOD PRESSURE: 84 MMHG | SYSTOLIC BLOOD PRESSURE: 144 MMHG | HEART RATE: 79 BPM

## 2024-08-16 DIAGNOSIS — R60.9 EDEMA, UNSPECIFIED TYPE: ICD-10-CM

## 2024-08-16 DIAGNOSIS — Z86.12 HISTORY OF POLIOMYELITIS: ICD-10-CM

## 2024-08-16 DIAGNOSIS — M25.511 ACUTE PAIN OF RIGHT SHOULDER: ICD-10-CM

## 2024-08-16 DIAGNOSIS — R26.81 UNSTEADY GAIT: Primary | ICD-10-CM

## 2024-08-16 PROCEDURE — 99214 OFFICE O/P EST MOD 30 MIN: CPT | Performed by: FAMILY MEDICINE

## 2024-08-16 PROCEDURE — 3077F SYST BP >= 140 MM HG: CPT | Performed by: FAMILY MEDICINE

## 2024-08-16 PROCEDURE — 3079F DIAST BP 80-89 MM HG: CPT | Performed by: FAMILY MEDICINE

## 2024-08-16 PROCEDURE — 1125F AMNT PAIN NOTED PAIN PRSNT: CPT | Performed by: FAMILY MEDICINE

## 2024-08-16 NOTE — PROGRESS NOTES
Follow Up Office Visit      Date of Visit:  2024   Patient Name: Carroll Frost  : 1940   MRN: [id number]     Chief Complaint:    Chief Complaint   Patient presents with    increased falls       History of Present Illness: Carroll Frost is a 83 y.o. male who is here today for follow up.    History of Present Illness  The patient is an 83-year-old gentleman here for follow-up.    He has experienced several falls due to balance issues. He is scheduled for a cardiac evaluation next week, which includes an echocardiogram on 2024 at 10:45 AM and a follow-up appointment on 2024.    He reports numbness in his hand, a condition that has persisted since a dog bite incident years ago.    He also mentions occasional shoulder pain, which he manages with ice and heat applications. He has previously used Voltaren gel for this issue.      He has been advised to reduce his amlodipine dosage to 5 mg and increase his losartan potassium dosage to two tablets daily. He also takes a water pill daily, with the option to take an additional pill on days when his swelling is more severe.      Subjective      Review of Systems:   Review of Systems   Constitutional:  Positive for fatigue. Negative for fever.   HENT:  Negative for congestion and ear pain.    Respiratory:  Negative for apnea, cough, chest tightness and shortness of breath.    Cardiovascular:  Negative for chest pain.   Gastrointestinal:  Negative for abdominal pain, constipation, diarrhea and nausea.   Musculoskeletal:  Positive for arthralgias.   Psychiatric/Behavioral:  Negative for depressed mood and stress.        Past Medical History:   Past Medical History:   Diagnosis Date    Acute midline low back pain without sciatica     BPH (benign prostatic hyperplasia)     Chronic bilateral low back pain without sciatica     Chronic sinusitis     hx chronic sinusitis and nasal polyps    Class 1 obesity due to excess calories with serious comorbidity  and body mass index (BMI) of 30.0 to 30.9 in adult     Cluster headaches     resolved as he got older    Essential hypertension     Frequent falls     Glaucoma     H/O oral aphthous ulcers     Recurrent apthous ulcers    Hemorrhoids     hx of    History of poliomyelitis     Hyperlipidemia     meds    Hypertension     meds    Insomnia     Megaloblastic anemia     hx of    Mild episode of recurrent major depressive disorder     Mixed hyperlipidemia     Neuropathy     Obstructive sleep apnea syndrome     CPAP, not successful using so far    Polio     hx polio    PUD (peptic ulcer disease)     hx PUD with H pylori - s/p antibiotic tx for H pylori    Shoulder arthritis        Past Surgical History: No past surgical history on file.    Family History:   Family History   Problem Relation Age of Onset    Asthma Mother     Hypertension Father        Social History:   Social History     Socioeconomic History    Marital status:    Tobacco Use    Smoking status: Never    Smokeless tobacco: Never    Tobacco comments:     Tobacco/Vaping use   Vaping Use    Vaping status: Never Used   Substance and Sexual Activity    Alcohol use: Not Currently    Drug use: Defer    Sexual activity: Defer       Medications:     Current Outpatient Medications:     amLODIPine (NORVASC) 10 MG tablet, Take 0.5 tablets by mouth Daily., Disp: 30 tablet, Rfl: 5    bumetanide (BUMEX) 1 MG tablet, Take 1 tablet by mouth Daily. 1 additional tab as needed for worsening of swelling, Disp: 120 tablet, Rfl: 3    buPROPion XL (Wellbutrin XL) 150 MG 24 hr tablet, Take 1 tablet by mouth Daily., Disp: 60 tablet, Rfl: 2    gabapentin (NEURONTIN) 100 MG capsule, TAKE ONE CAPSULE BY MOUTH THREE TIMES A DAY, Disp: 90 capsule, Rfl: 1    losartan (COZAAR) 50 MG tablet, Take 2 tablets by mouth Daily., Disp: , Rfl:     metoprolol tartrate (LOPRESSOR) 25 MG tablet, TAKE 1 TABLET BY MOUTH TWICE A DAY, Disp: 180 tablet, Rfl: 1    nabumetone (RELAFEN) 750 MG tablet, TAKE  1 TABLET BY MOUTH TWICE A DAY, Disp: 180 tablet, Rfl: 1    oxybutynin XL (DITROPAN-XL) 10 MG 24 hr tablet, TAKE 1 TABLET BY MOUTH DAILY, Disp: 90 tablet, Rfl: 0    pantoprazole (PROTONIX) 40 MG EC tablet, TAKE 1 TABLET BY MOUTH TWICE A DAY, Disp: 180 tablet, Rfl: 1    potassium chloride ER (K-TAB) 20 MEQ tablet controlled-release ER tablet, TAKE ONE TABLET BY MOUTH DAILY WITH FOOD, Disp: 90 tablet, Rfl: 1    rosuvastatin (CRESTOR) 10 MG tablet, TAKE 1 TABLET BY MOUTH DAILY, Disp: 90 tablet, Rfl: 1    tamsulosin (FLOMAX) 0.4 MG capsule 24 hr capsule, TAKE 1 CAPSULE BY MOUTH DAILY, Disp: 90 capsule, Rfl: 1    Allergies:   No Known Allergies    Objective     Physical Exam:  Vital Signs:   Vitals:    08/16/24 1051   BP: 144/84   Pulse: 79   SpO2: 99%     There is no height or weight on file to calculate BMI.     Physical Exam  Vitals and nursing note reviewed.   Constitutional:       General: He is not in acute distress.     Appearance: Normal appearance. He is not ill-appearing.   HENT:      Head: Normocephalic and atraumatic.      Right Ear: Tympanic membrane and ear canal normal.      Left Ear: Tympanic membrane and ear canal normal.      Nose: Nose normal.   Cardiovascular:      Rate and Rhythm: Normal rate and regular rhythm.      Heart sounds: Normal heart sounds.   Pulmonary:      Effort: Pulmonary effort is normal.      Breath sounds: Normal breath sounds.   Musculoskeletal:      Right lower leg: Edema present.      Left lower leg: Edema present.   Neurological:      Mental Status: He is alert and oriented to person, place, and time. Mental status is at baseline.      Gait: Gait abnormal.   Psychiatric:         Mood and Affect: Mood normal.       Physical Exam      Procedures    Results    Assessment / Plan      Assessment/Plan:   Diagnoses and all orders for this visit:    1. Unsteady gait (Primary)    2. Edema, unspecified type    3. History of poliomyelitis    4. Acute pain of right shoulder       Assessment  & Plan  1. Recurrent Falls.  He reports multiple falls, likely due to balance issues and leg weakness. He was offered home health services for physical therapy and gait and balance training, but he declined. He was advised to use his walker more consistently to prevent falls. If he experiences another fall, he is instructed to contact the office to set up physical therapy.    2. Left Leg Swelling.  He has been experiencing swelling in his left leg. He was advised to continue taking his current medications, including a water pill (diuretic). He can take an extra water pill on days when the swelling is worse.    3. Shoulder Pain.  He reports shoulder pain, especially after a fall. He was advised to use Voltaren gel, an over-the-counter medication, to alleviate the discomfort. If the pain does not improve, a steroid injection may be considered.    4. Hypertension.  His blood pressure remains stable. He was instructed to take half a tablet of amlodipine 5 mg and two tablets of losartan potassium daily. He was also advised to take one water pill daily, with the option to take an additional pill on days when swelling is worse.    5. Upcoming Echocardiogram.  He has an echocardiogram scheduled for 08/20/2024 at 10:45 AM to assess his heart function. He will follow up with his cardiologist on 08/23/2024 to discuss the results.            Follow Up:   No follow-ups on file.    Carroll Malone  [facility]     Patient or patient representative verbalized consent for the use of Ambient Listening during the visit with  Carroll Malone MD for chart documentation. 8/16/2024  11:48 EDT

## 2024-08-21 ENCOUNTER — TELEPHONE (OUTPATIENT)
Dept: CARDIOLOGY | Facility: CLINIC | Age: 84
End: 2024-08-21
Payer: MEDICARE

## 2024-08-21 NOTE — TELEPHONE ENCOUNTER
----- Message from Remigio Cadena sent at 8/21/2024  8:51 AM EDT -----  Please inform patient that his transthoracic echocardiogram showed normal cardiac function and normal for age stiffening indices.   Thank you!

## 2024-08-23 ENCOUNTER — OFFICE VISIT (OUTPATIENT)
Dept: CARDIOLOGY | Facility: CLINIC | Age: 84
End: 2024-08-23
Payer: MEDICARE

## 2024-08-23 VITALS
RESPIRATION RATE: 18 BRPM | WEIGHT: 200.6 LBS | OXYGEN SATURATION: 98 % | DIASTOLIC BLOOD PRESSURE: 78 MMHG | SYSTOLIC BLOOD PRESSURE: 126 MMHG | HEIGHT: 65 IN | BODY MASS INDEX: 33.42 KG/M2 | HEART RATE: 69 BPM

## 2024-08-23 DIAGNOSIS — R60.0 EDEMA OF BOTH LEGS: Primary | ICD-10-CM

## 2024-08-23 DIAGNOSIS — E78.2 MIXED HYPERLIPIDEMIA: ICD-10-CM

## 2024-08-23 DIAGNOSIS — I10 ESSENTIAL HYPERTENSION: ICD-10-CM

## 2024-08-23 PROCEDURE — 1160F RVW MEDS BY RX/DR IN RCRD: CPT | Performed by: INTERNAL MEDICINE

## 2024-08-23 PROCEDURE — 99214 OFFICE O/P EST MOD 30 MIN: CPT | Performed by: INTERNAL MEDICINE

## 2024-08-23 PROCEDURE — 3074F SYST BP LT 130 MM HG: CPT | Performed by: INTERNAL MEDICINE

## 2024-08-23 PROCEDURE — 1159F MED LIST DOCD IN RCRD: CPT | Performed by: INTERNAL MEDICINE

## 2024-08-23 PROCEDURE — 3078F DIAST BP <80 MM HG: CPT | Performed by: INTERNAL MEDICINE

## 2024-08-23 RX ORDER — LOSARTAN POTASSIUM 100 MG/1
100 TABLET ORAL DAILY
Qty: 90 TABLET | Refills: 3 | Status: SHIPPED | OUTPATIENT
Start: 2024-08-23

## 2024-08-23 RX ORDER — AMLODIPINE BESYLATE 5 MG/1
5 TABLET ORAL DAILY
Qty: 90 TABLET | Refills: 3 | Status: SHIPPED | OUTPATIENT
Start: 2024-08-23

## 2024-08-23 NOTE — ASSESSMENT & PLAN NOTE
Hypertension is stable and controlled  Continue current treatment regimen.  Medication tablet dosing changed to simplify regimen for amlodipine and losartan.  Blood pressure will be reassessed in 6 months.

## 2024-08-23 NOTE — PROGRESS NOTES
[facility]  [facility]  [address] [place] [address]  Dept: [phone]  Dept Fax: [phone]    Date: 08/23/2024  Patient: Carroll Frost  YOB: 1940    Follow Up Office Visit Note    Interval Follow-up  Mr. Carroll Frost is a 83 y.o. male who is here for follow-up on edema of both legs.    Subjective   Patient doing well with no complaints.  Patient denies angina, orthopnea, PND, palpitations, lightheadedness, syncope or medications side-effects.  Swelling present lower extremity circulation related somewhat improved with compression stockings, leg elevation and water pills.    The following portions of the patient's history were reviewed and updated as appropriate: allergies, current medications, past family history, past medical history, past social history, past surgical history, and problem list.    Medications: No Known Allergies   Current Outpatient Medications   Medication Instructions    amLODIPine (NORVASC) 5 mg, Oral, Daily    bumetanide (BUMEX) 1 mg, Oral, Daily, 1 additional tab as needed for worsening of swelling    buPROPion XL (WELLBUTRIN XL) 150 mg, Oral, Daily    gabapentin (NEURONTIN) 100 mg, Oral, 3 Times Daily    losartan (COZAAR) 100 mg, Oral, Daily    metoprolol tartrate (LOPRESSOR) 25 mg, Oral, 2 Times Daily    nabumetone (RELAFEN) 750 mg, Oral, 2 Times Daily    oxybutynin XL (DITROPAN-XL) 10 mg, Oral, Daily    pantoprazole (PROTONIX) 40 mg, Oral, 2 Times Daily    potassium chloride ER (K-TAB) 20 MEQ tablet controlled-release ER tablet TAKE ONE TABLET BY MOUTH DAILY WITH FOOD    rosuvastatin (CRESTOR) 10 mg, Oral, Daily    tamsulosin (FLOMAX) 0.4 mg, Oral, Daily       Tobacco Use: Low Risk  (8/23/2024)    Patient History     Smoking Tobacco Use: Never     Smokeless Tobacco Use: Never     Passive Exposure: Not on file        Objective  Vitals:    08/23/24 1113   BP: 126/78   Pulse: 69   Resp: 18   SpO2: 98%   Weight: 91 kg  "(200 lb 9.6 oz)   Height: 165.1 cm (65\")   PainSc:   3   PainLoc: Generalized      Vitals:    08/23/24 1113   BP: 126/78   Pulse: 69   Resp: 18   SpO2: 98%   Weight: 91 kg (200 lb 9.6 oz)   Height: 165.1 cm (65\")          Physical Exam  Constitutional:       Appearance: Healthy appearance. Not in distress.   Eyes:      Pupils: Pupils are equal, round, and reactive to light.   Neck:      Vascular: No JVR. JVD normal.   Pulmonary:      Effort: Pulmonary effort is normal.      Breath sounds: Normal breath sounds. No wheezing. No rhonchi. No rales.   Chest:      Chest wall: Not tender to palpatation.   Cardiovascular:      PMI at left midclavicular line. Normal rate. Regular rhythm. Normal S1 with normal intensity. Normal S2 with normal intensity.       Murmurs: There is no murmur.      No gallop.  No click. No rub.      Comments: Partially pitting with poor circulation  Pulses:     Intact distal pulses.   Edema:     Thigh: bilateral 1+ edema of the thigh.     Pretibial: bilateral 1+ edema of the pretibial area.     Ankle: bilateral 1+ edema of the ankle.     Feet: bilateral 1+ edema of the feet.  Abdominal:      General: There is no abdominal bruit.   Skin:     General: Skin is warm.   Neurological:      Mental Status: Alert and oriented to person, place and time.              Diagnostic Data  Lab Results   Component Value Date     07/11/2024    K 4.5 07/11/2024     07/11/2024    CO2 21 07/11/2024    BUN 11 07/11/2024    CREATININE 1.29 (H) 07/11/2024    CALCIUM 9.2 07/11/2024    BILITOT 0.3 07/11/2024    ALKPHOS 77 07/11/2024    ALT 17 07/11/2024    AST 18 07/11/2024    GLUCOSE 83 07/11/2024    ALBUMIN 3.9 07/11/2024     Lab Results   Component Value Date    WBC 6.2 07/11/2024    HGB 11.9 (L) 07/11/2024    HCT 36.1 (L) 07/11/2024     (L) 07/11/2024     No results found for: \"APTT\", \"INR\", \"PTT\"  No results found for: \"CKTOTAL\", \"TROPONINI\", \"TROPONINT\", \"CKMBINDEX\", \"BNP\"  No results found for: " "\"BNP\", \"PROBNP\"    Lab Results   Component Value Date    CHLPL 125 05/09/2024    TRIG 76 05/09/2024    HDL 55 05/09/2024    LDL 55 05/09/2024     Lab Results   Component Value Date    TSH 2.120 05/09/2024       CV Diagnostics:  Procedures    CXR: No results found for this or any previous visit.     ECHO/MUGA: Results for orders placed in visit on 08/20/24    Adult Transthoracic Echo Complete W/ Cont if Necessary Per Protocol    Interpretation Summary    Left ventricular systolic function is normal. Estimated left ventricular EF = 60% Left ventricular ejection fraction appears to be 56 - 60%.    Left ventricular diastolic function is consistent with (grade I) impaired relaxation.    Estimated right ventricular systolic pressure from tricuspid regurgitation is normal (<35 mmHg). Calculated right ventricular systolic pressure from tricuspid regurgitation is 28 mmHg.     STRESS TESTS: No results found for this or any previous visit.     CARDIAC CATH: No results found for this or any previous visit.     DEVICES: No valid procedures specified.   HOLTER: No results found for this or any previous visit.     CT/MRI:  No results found for this or any previous visit.    VASCULAR: No valid procedures specified.     Assessment and Plan  Diagnoses and all orders for this visit:    1. Edema of both legs (Primary)  Assessment & Plan:  Multifactorial.  Transthoracic echocardiogram normal.  Age-related poor circulation, high salt diet, amlodipine, other.  No associated shortness of breath or chest pain. Plan:  Low-salt diet counseling - reading labels, avoiding processed meats and cheeses, avoiding pops and sodas  Encourage compression stockings and leg elevation as discussed  Continue optimal blood pressure control and diuretics  Hold off spironolactone as no indication as of yet.      2. Essential hypertension  Assessment & Plan:  Hypertension is stable and controlled  Continue current treatment regimen.  Medication tablet dosing " changed to simplify regimen for amlodipine and losartan.  Blood pressure will be reassessed in 6 months.      3. Mixed hyperlipidemia  Assessment & Plan:   Lipid abnormalities are improving with treatment.    Plan:  Continue same medication/s without change.  Rosuvastatin 10 mg p.o. daily.    Lab Results   Component Value Date    LDL 55 05/09/2024    LDL 54 08/04/2023    LDL 75 01/27/2023    HDL 55 05/09/2024    HDL 55 08/04/2023    HDL 59 01/27/2023    CHLPL 125 05/09/2024    CHLPL 133 08/04/2023    CHLPL 150 01/27/2023    TRIG 76 05/09/2024    TRIG 138 08/04/2023    TRIG 82 01/27/2023       Patient Treatment Goals:   LDL goal is less than 70, to target    Followup in 6 months.      Other orders  -     amLODIPine (NORVASC) 5 MG tablet; Take 1 tablet by mouth Daily.  Dispense: 90 tablet; Refill: 3  -     losartan (COZAAR) 100 MG tablet; Take 1 tablet by mouth Daily.  Dispense: 90 tablet; Refill: 3         Return in about 6 months (around 2/23/2025) for Follow-up with Dr Cadena.    There are no Patient Instructions on file for this visit.    Remigio Cadena MD

## 2024-08-23 NOTE — ASSESSMENT & PLAN NOTE
Multifactorial.  Transthoracic echocardiogram normal.  Age-related poor circulation, high salt diet, amlodipine, other.  No associated shortness of breath or chest pain. Plan:  Low-salt diet counseling - reading labels, avoiding processed meats and cheeses, avoiding pops and sodas  Encourage compression stockings and leg elevation as discussed  Continue optimal blood pressure control and diuretics  Hold off spironolactone as no indication as of yet.

## 2024-08-23 NOTE — ASSESSMENT & PLAN NOTE
Lipid abnormalities are improving with treatment.    Plan:  Continue same medication/s without change.  Rosuvastatin 10 mg p.o. daily.    Lab Results   Component Value Date    LDL 55 05/09/2024    LDL 54 08/04/2023    LDL 75 01/27/2023    HDL 55 05/09/2024    HDL 55 08/04/2023    HDL 59 01/27/2023    CHLPL 125 05/09/2024    CHLPL 133 08/04/2023    CHLPL 150 01/27/2023    TRIG 76 05/09/2024    TRIG 138 08/04/2023    TRIG 82 01/27/2023       Patient Treatment Goals:   LDL goal is less than 70, to target    Followup in 6 months.

## 2024-10-01 RX ORDER — ROSUVASTATIN CALCIUM 10 MG/1
10 TABLET, COATED ORAL DAILY
Qty: 90 TABLET | Refills: 1 | Status: SHIPPED | OUTPATIENT
Start: 2024-10-01

## 2024-10-07 RX ORDER — OXYBUTYNIN CHLORIDE 10 MG/1
10 TABLET, EXTENDED RELEASE ORAL DAILY
Qty: 90 TABLET | Refills: 0 | Status: SHIPPED | OUTPATIENT
Start: 2024-10-07

## 2024-10-11 RX ORDER — POTASSIUM CHLORIDE 1500 MG/1
TABLET, EXTENDED RELEASE ORAL
Qty: 90 TABLET | Refills: 0 | Status: SHIPPED | OUTPATIENT
Start: 2024-10-11

## 2024-12-09 DIAGNOSIS — E11.43 TYPE 2 DIABETES MELLITUS WITH DIABETIC AUTONOMIC NEUROPATHY, WITHOUT LONG-TERM CURRENT USE OF INSULIN: ICD-10-CM

## 2024-12-09 RX ORDER — GABAPENTIN 100 MG/1
100 CAPSULE ORAL 3 TIMES DAILY
Qty: 90 CAPSULE | Refills: 1 | Status: SHIPPED | OUTPATIENT
Start: 2024-12-09

## 2024-12-12 RX ORDER — BUMETANIDE 1 MG/1
1 TABLET ORAL DAILY
Qty: 90 TABLET | Refills: 0 | Status: SHIPPED | OUTPATIENT
Start: 2024-12-12

## 2024-12-20 ENCOUNTER — OFFICE VISIT (OUTPATIENT)
Dept: FAMILY MEDICINE CLINIC | Facility: CLINIC | Age: 84
End: 2024-12-20
Payer: MEDICARE

## 2024-12-20 VITALS
HEART RATE: 86 BPM | WEIGHT: 206 LBS | OXYGEN SATURATION: 97 % | DIASTOLIC BLOOD PRESSURE: 80 MMHG | SYSTOLIC BLOOD PRESSURE: 156 MMHG | HEIGHT: 65 IN | BODY MASS INDEX: 34.32 KG/M2

## 2024-12-20 DIAGNOSIS — I10 PRIMARY HYPERTENSION: ICD-10-CM

## 2024-12-20 DIAGNOSIS — Z86.12 HISTORY OF POLIOMYELITIS: ICD-10-CM

## 2024-12-20 DIAGNOSIS — Z00.00 ENCOUNTER FOR MEDICARE ANNUAL WELLNESS EXAM: Primary | ICD-10-CM

## 2024-12-20 DIAGNOSIS — I10 ESSENTIAL HYPERTENSION: ICD-10-CM

## 2024-12-20 DIAGNOSIS — R26.81 UNSTEADY GAIT: ICD-10-CM

## 2024-12-20 DIAGNOSIS — E11.43 TYPE 2 DIABETES MELLITUS WITH DIABETIC AUTONOMIC NEUROPATHY, WITHOUT LONG-TERM CURRENT USE OF INSULIN: ICD-10-CM

## 2024-12-20 DIAGNOSIS — G14 POST-POLIO SYNDROME: ICD-10-CM

## 2024-12-20 DIAGNOSIS — F39 MOOD DISORDER: ICD-10-CM

## 2024-12-20 PROCEDURE — G0439 PPPS, SUBSEQ VISIT: HCPCS | Performed by: FAMILY MEDICINE

## 2024-12-20 PROCEDURE — 1126F AMNT PAIN NOTED NONE PRSNT: CPT | Performed by: FAMILY MEDICINE

## 2024-12-20 PROCEDURE — 99214 OFFICE O/P EST MOD 30 MIN: CPT | Performed by: FAMILY MEDICINE

## 2024-12-20 RX ORDER — METOPROLOL TARTRATE 25 MG/1
25 TABLET, FILM COATED ORAL 2 TIMES DAILY
Qty: 180 TABLET | Refills: 1 | Status: SHIPPED | OUTPATIENT
Start: 2024-12-20

## 2024-12-20 RX ORDER — OXYBUTYNIN CHLORIDE 10 MG/1
10 TABLET, EXTENDED RELEASE ORAL DAILY
Qty: 90 TABLET | Refills: 1 | Status: SHIPPED | OUTPATIENT
Start: 2024-12-20

## 2024-12-20 RX ORDER — PANTOPRAZOLE SODIUM 40 MG/1
40 TABLET, DELAYED RELEASE ORAL 2 TIMES DAILY
Qty: 180 TABLET | Refills: 1 | Status: SHIPPED | OUTPATIENT
Start: 2024-12-20

## 2024-12-20 RX ORDER — POTASSIUM CHLORIDE 1500 MG/1
20 TABLET, EXTENDED RELEASE ORAL DAILY
Qty: 90 TABLET | Refills: 1 | Status: SHIPPED | OUTPATIENT
Start: 2024-12-20

## 2024-12-20 RX ORDER — BUPROPION HYDROCHLORIDE 150 MG/1
150 TABLET ORAL DAILY
Qty: 60 TABLET | Refills: 5 | Status: SHIPPED | OUTPATIENT
Start: 2024-12-20

## 2024-12-20 RX ORDER — TAMSULOSIN HYDROCHLORIDE 0.4 MG/1
1 CAPSULE ORAL DAILY
Qty: 90 CAPSULE | Refills: 1 | Status: SHIPPED | OUTPATIENT
Start: 2024-12-20

## 2024-12-21 LAB
ALBUMIN SERPL-MCNC: 3.9 G/DL (ref 3.7–4.7)
ALP SERPL-CCNC: 93 IU/L (ref 44–121)
ALT SERPL-CCNC: 17 IU/L (ref 0–44)
AST SERPL-CCNC: 15 IU/L (ref 0–40)
BASOPHILS # BLD AUTO: 0 X10E3/UL (ref 0–0.2)
BASOPHILS NFR BLD AUTO: 1 %
BILIRUB SERPL-MCNC: 0.3 MG/DL (ref 0–1.2)
BUN SERPL-MCNC: 21 MG/DL (ref 8–27)
BUN/CREAT SERPL: 15 (ref 10–24)
CALCIUM SERPL-MCNC: 8.9 MG/DL (ref 8.6–10.2)
CHLORIDE SERPL-SCNC: 101 MMOL/L (ref 96–106)
CHOLEST SERPL-MCNC: 128 MG/DL (ref 100–199)
CO2 SERPL-SCNC: 24 MMOL/L (ref 20–29)
CREAT SERPL-MCNC: 1.44 MG/DL (ref 0.76–1.27)
EGFRCR SERPLBLD CKD-EPI 2021: 47 ML/MIN/1.73
EOSINOPHIL # BLD AUTO: 0.3 X10E3/UL (ref 0–0.4)
EOSINOPHIL NFR BLD AUTO: 4 %
ERYTHROCYTE [DISTWIDTH] IN BLOOD BY AUTOMATED COUNT: 13.8 % (ref 11.6–15.4)
GLOBULIN SER CALC-MCNC: 2.5 G/DL (ref 1.5–4.5)
GLUCOSE SERPL-MCNC: 106 MG/DL (ref 70–99)
HCT VFR BLD AUTO: 37.3 % (ref 37.5–51)
HDLC SERPL-MCNC: 57 MG/DL
HGB BLD-MCNC: 12.5 G/DL (ref 13–17.7)
IMM GRANULOCYTES # BLD AUTO: 0 X10E3/UL (ref 0–0.1)
IMM GRANULOCYTES NFR BLD AUTO: 0 %
LDLC SERPL CALC-MCNC: 53 MG/DL (ref 0–99)
LYMPHOCYTES # BLD AUTO: 1.3 X10E3/UL (ref 0.7–3.1)
LYMPHOCYTES NFR BLD AUTO: 22 %
MCH RBC QN AUTO: 31 PG (ref 26.6–33)
MCHC RBC AUTO-ENTMCNC: 33.5 G/DL (ref 31.5–35.7)
MCV RBC AUTO: 93 FL (ref 79–97)
MONOCYTES # BLD AUTO: 0.7 X10E3/UL (ref 0.1–0.9)
MONOCYTES NFR BLD AUTO: 11 %
NEUTROPHILS # BLD AUTO: 3.9 X10E3/UL (ref 1.4–7)
NEUTROPHILS NFR BLD AUTO: 62 %
PLATELET # BLD AUTO: 132 X10E3/UL (ref 150–450)
POTASSIUM SERPL-SCNC: 4.3 MMOL/L (ref 3.5–5.2)
PROT SERPL-MCNC: 6.4 G/DL (ref 6–8.5)
RBC # BLD AUTO: 4.03 X10E6/UL (ref 4.14–5.8)
SODIUM SERPL-SCNC: 139 MMOL/L (ref 134–144)
TRIGL SERPL-MCNC: 95 MG/DL (ref 0–149)
TSH SERPL DL<=0.005 MIU/L-ACNC: 2.64 UIU/ML (ref 0.45–4.5)
VLDLC SERPL CALC-MCNC: 18 MG/DL (ref 5–40)
WBC # BLD AUTO: 6.2 X10E3/UL (ref 3.4–10.8)

## 2024-12-24 NOTE — PROGRESS NOTES
Subjective   The ABCs of the Annual Wellness Visit  Medicare Wellness Visit      Carroll Frost is a 84 y.o. patient who presents for a Medicare Wellness Visit.    The following portions of the patient's history were reviewed and   updated as appropriate: allergies, current medications, past family history, past medical history, past social history, past surgical history, and problem list.    Compared to one year ago, the patient's physical   health is the same.  Compared to one year ago, the patient's mental   health is the same.    Recent Hospitalizations:  He was not admitted to the hospital during the last year.     Current Medical Providers:  Patient Care Team:  Carroll Malone MD as PCP - General (Family Medicine)    Outpatient Medications Prior to Visit   Medication Sig Dispense Refill    amLODIPine (NORVASC) 5 MG tablet Take 1 tablet by mouth Daily. 90 tablet 3    bumetanide (BUMEX) 1 MG tablet TAKE 1 TABLET BY MOUTH DAILY 90 tablet 0    gabapentin (NEURONTIN) 100 MG capsule TAKE 1 CAPSULE BY MOUTH 3 TIMES A DAY 90 capsule 1    losartan (COZAAR) 100 MG tablet Take 1 tablet by mouth Daily. 90 tablet 3    nabumetone (RELAFEN) 750 MG tablet TAKE 1 TABLET BY MOUTH 2 TIMES A  tablet 1    rosuvastatin (CRESTOR) 10 MG tablet TAKE 1 TABLET BY MOUTH DAILY 90 tablet 1    buPROPion XL (Wellbutrin XL) 150 MG 24 hr tablet Take 1 tablet by mouth Daily. 60 tablet 2    metoprolol tartrate (LOPRESSOR) 25 MG tablet TAKE 1 TABLET BY MOUTH TWICE A  tablet 1    oxybutynin XL (DITROPAN-XL) 10 MG 24 hr tablet TAKE 1 TABLET BY MOUTH DAILY 90 tablet 0    pantoprazole (PROTONIX) 40 MG EC tablet TAKE 1 TABLET BY MOUTH TWICE A  tablet 1    potassium chloride ER (K-TAB) 20 MEQ tablet controlled-release ER tablet TAKE ONE TABLET BY MOUTH DAILY WITH FOOD 90 tablet 0    tamsulosin (FLOMAX) 0.4 MG capsule 24 hr capsule TAKE 1 CAPSULE BY MOUTH DAILY 90 capsule 1     No facility-administered medications prior to  "visit.     No opioid medication identified on active medication list. I have reviewed chart for other potential  high risk medication/s and harmful drug interactions in the elderly.      Aspirin is not on active medication list.  Aspirin use is not indicated based on review of current medical condition/s. Risk of harm outweighs potential benefits.  .    Patient Active Problem List   Diagnosis    Overactive bladder    Hyperlipidemia    Mood disorder    Lower urinary tract symptoms due to benign prostatic hyperplasia    Edema    Obesity (BMI 35.0-39.9 without comorbidity)    Essential hypertension    Edema of both legs     Advance Care Planning Advance Directive is not on file.  ACP discussion was held with the patient during this visit. Patient does not have an advance directive, information provided.            Objective   Vitals:    12/20/24 1523   BP: 156/80   Pulse: 86   SpO2: 97%   Weight: 93.4 kg (206 lb)   Height: 165.1 cm (65\")   PainSc: 0-No pain       Estimated body mass index is 34.28 kg/m² as calculated from the following:    Height as of this encounter: 165.1 cm (65\").    Weight as of this encounter: 93.4 kg (206 lb).                Does the patient have evidence of cognitive impairment? No  Lab Results   Component Value Date    CHLPL 128 12/20/2024    TRIG 95 12/20/2024    HDL 57 12/20/2024    LDL 53 12/20/2024    VLDL 18 12/20/2024                                                                                                Health  Risk Assessment    Smoking Status:  Social History     Tobacco Use   Smoking Status Never   Smokeless Tobacco Never   Tobacco Comments    Tobacco/Vaping use     Alcohol Consumption:  Social History     Substance and Sexual Activity   Alcohol Use Not Currently       Fall Risk Screen  STEADI Fall Risk Assessment was completed, and patient is at HIGH risk for falls. Assessment completed on:12/20/2024    Depression Screening   Little interest or pleasure in doing things? Not at " all   Feeling down, depressed, or hopeless? Not at all   PHQ-2 Total Score 0      Health Habits and Functional and Cognitive Screenin/20/2024     3:25 PM   Functional & Cognitive Status   Do you have difficulty preparing food and eating? No   Do you have difficulty bathing yourself, getting dressed or grooming yourself? No   Do you have difficulty using the toilet? No   Do you have difficulty moving around from place to place? No   Do you have trouble with steps or getting out of a bed or a chair? Yes   Current Diet Well Balanced Diet   Dental Exam Up to date   Eye Exam Up to date   Exercise (times per week) 0 times per week   Current Exercises Include No Regular Exercise   Do you need help using the phone?  No   Are you deaf or do you have serious difficulty hearing?  No   Do you need help to go to places out of walking distance? No   Do you need help shopping? No   Do you need help preparing meals?  No   Do you need help with housework?  No   Do you need help with laundry? No   Do you need help taking your medications? No   Do you need help managing money? No   Do you ever drive or ride in a car without wearing a seat belt? Yes   Have you felt unusual stress, anger or loneliness in the last month? Yes   Who do you live with? Alone   If you need help, do you have trouble finding someone available to you? No   Have you been bothered in the last four weeks by sexual problems? No   Do you have difficulty concentrating, remembering or making decisions? Yes           Age-appropriate Screening Schedule:  Refer to the list below for future screening recommendations based on patient's age, sex and/or medical conditions. Orders for these recommended tests are listed in the plan section. The patient has been provided with a written plan.    Health Maintenance List  Health Maintenance   Topic Date Due    Pneumococcal Vaccine 65+ (1 of 2 - PCV) 1946    DIABETIC FOOT EXAM  Never done    ZOSTER VACCINE (1 of 2)  11/25/1988    TDAP/TD VACCINES (1 - Tdap) 05/13/2009    RSV Vaccine - Adults (1 - 1-dose 75+ series) Never done    ANNUAL WELLNESS VISIT  01/27/2024    HEMOGLOBIN A1C  02/04/2024    DIABETIC EYE EXAM  04/06/2024    BMI FOLLOWUP  07/11/2025    LIPID PANEL  12/20/2025    COVID-19 Vaccine  Completed    INFLUENZA VACCINE  Completed                                                                                                                                                CMS Preventative Services Quick Reference  Risk Factors Identified During Encounter  None Identified    The above risks/problems have been discussed with the patient.  Pertinent information has been shared with the patient in the After Visit Summary.  An After Visit Summary and PPPS were made available to the patient.    Follow Up:   Next Medicare Wellness visit to be scheduled in 1 year.         Additional E&M Note during same encounter follows:  Patient has additional, significant, and separately identifiable condition(s)/problem(s) that require work above and beyond the Medicare Wellness Visit     Chief Complaint  Annual Exam    Jeff    HPI  Carroll is also being seen today for additional medical problem/s.       The patient is an 84-year-old gentleman here for his annual wellness exam.    He has been experiencing difficulty maintaining an upright posture, leading to two recent falls. He uses a cane for mobility but has not yet utilized a rollator walker. He also reports pain when coughing, which he attributes to a previous fall. Additionally, he mentions that his thumb has become numb, limiting its functionality. He is currently on a regimen of 8 to 9 medications in the morning and 4 at night, totaling 12 to 13 pills daily.    He has been using a long leg brace for approximately 5 to 6 years and expresses a need for a replacement.    He has been feeling depressed lately.    MEDICATIONS  Current: Bumetanide    IMMUNIZATIONS  He has had his  "influenza vaccine and COVID-19 vaccines this year.  Review of Systems   Constitutional:  Negative for fatigue.   HENT:  Negative for congestion.    Respiratory:  Negative for apnea, cough and shortness of breath.    Cardiovascular:  Negative for chest pain.   Gastrointestinal:  Negative for abdominal pain.   Musculoskeletal:  Negative for arthralgias.   Neurological:  Negative for dizziness.   Psychiatric/Behavioral:  The patient is not nervous/anxious.           Objective   Vital Signs:  /80   Pulse 86   Ht 165.1 cm (65\")   Wt 93.4 kg (206 lb)   SpO2 97%   BMI 34.28 kg/m²   Physical Exam  Vitals and nursing note reviewed.   Constitutional:       General: He is not in acute distress.     Appearance: Normal appearance. He is not ill-appearing.   HENT:      Head: Normocephalic and atraumatic.      Right Ear: Tympanic membrane and ear canal normal.      Left Ear: Tympanic membrane and ear canal normal.      Nose: Nose normal.   Cardiovascular:      Rate and Rhythm: Normal rate and regular rhythm.      Heart sounds: Normal heart sounds.   Pulmonary:      Effort: Pulmonary effort is normal.      Breath sounds: Normal breath sounds.   Neurological:      Mental Status: He is alert and oriented to person, place, and time. Mental status is at baseline.   Psychiatric:         Mood and Affect: Mood normal.                       Results                Assessment and Plan Additional age appropriate preventative wellness advice topics were discussed during today's preventative wellness exam(some topics already addressed during AWV portion of the note above):    Physical Activity: Advised cardiovascular activity 150 minutes per week as tolerated. (example brisk walk for 30 minutes, 5 days a week).     Nutrition: Discussed nutrition plan with patient. Information shared in after visit summary. Goal is for a well balanced diet to enhance overall health.     Healthy Weight: Discussed current and goal BMI with patient. " Steps to attain this goal discussed. Information shared in after visit summary.       1. Annual wellness examination.  He has received his influenza and COVID-19 vaccines this year, but is due for a pneumonia vaccine. A comprehensive panel of laboratory tests will be ordered to assess kidney function, liver function, blood glucose levels, electrolyte balance, and thyroid function. His current medications will be reviewed and refilled as necessary.    2. Fall risk.  He reported falling twice recently and expressed difficulty using his current walker. He is advised to use the walker until the new brace is obtained. A prescription for a rollator walker with a seat and brakes will be provided. He is also advised to use a heating pad for rib discomfort, which may be related to his falls.    3. Depression.  He mentioned experiencing depression, especially during the winter and holidays. A prescription for Wellbutrin (bupropion) will be sent to his pharmacy to help manage his symptoms.    4. Long leg brace replacement.  He indicated that his current long leg brace is worn out and needs replacement. A prescription for a new long leg brace will be provided. He is instructed to contact the medical supply store to arrange for the new brace.            Follow Up   No follow-ups on file.  Patient was given instructions and counseling regarding his condition or for health maintenance advice. Please see specific information pulled into the AVS if appropriate.  Patient or patient representative verbalized consent for the use of Ambient Listening during the visit with  Carroll Malone MD for chart documentation. 12/24/2024  09:05 EST

## 2024-12-31 RX ORDER — BUPROPION HYDROCHLORIDE 150 MG/1
150 TABLET ORAL DAILY
Qty: 90 TABLET | OUTPATIENT
Start: 2024-12-31

## 2024-12-31 RX ORDER — PANTOPRAZOLE SODIUM 40 MG/1
40 TABLET, DELAYED RELEASE ORAL 2 TIMES DAILY
Qty: 180 TABLET | Refills: 1 | OUTPATIENT
Start: 2024-12-31

## 2025-02-05 ENCOUNTER — OFFICE VISIT (OUTPATIENT)
Dept: FAMILY MEDICINE CLINIC | Facility: CLINIC | Age: 85
End: 2025-02-05
Payer: MEDICARE

## 2025-02-05 VITALS
HEIGHT: 65 IN | SYSTOLIC BLOOD PRESSURE: 140 MMHG | OXYGEN SATURATION: 97 % | BODY MASS INDEX: 33.66 KG/M2 | WEIGHT: 202 LBS | HEART RATE: 82 BPM | DIASTOLIC BLOOD PRESSURE: 80 MMHG

## 2025-02-05 DIAGNOSIS — G14 POST-POLIO SYNDROME: ICD-10-CM

## 2025-02-05 DIAGNOSIS — M25.511 CHRONIC RIGHT SHOULDER PAIN: Primary | ICD-10-CM

## 2025-02-05 DIAGNOSIS — G89.29 CHRONIC RIGHT SHOULDER PAIN: Primary | ICD-10-CM

## 2025-02-05 DIAGNOSIS — R26.81 UNSTEADY GAIT: ICD-10-CM

## 2025-02-05 PROCEDURE — 1126F AMNT PAIN NOTED NONE PRSNT: CPT | Performed by: FAMILY MEDICINE

## 2025-02-05 PROCEDURE — 99213 OFFICE O/P EST LOW 20 MIN: CPT | Performed by: FAMILY MEDICINE

## 2025-02-05 NOTE — PROGRESS NOTES
Follow Up Office Visit      Date of Visit:  2025   Patient Name: Carroll Frost  : 1940   MRN: [id number]     Chief Complaint:    Chief Complaint   Patient presents with    Shoulder Pain     right       History of Present Illness: Carroll Frost is a 84 y.o. male who is here today for follow up.    History of Present Illness  The patient presents for evaluation of left shoulder pain.    He reports a history of recurrent falls, with 2 incidents occurring on the same day. A few weeks prior, he experienced another fall while visiting his sister, during which he lost his balance and subsequently fell inside a building. He also mentions occasional episodes of shortness of breath. Despite these incidents, he does not experience any pain in his shoulder at night. However, he acknowledges a limitation in his shoulder mobility, which prevents him from performing certain tasks such as combing his hair. He expresses a preference for avoiding injections as a treatment option. He has previously engaged in physical therapy sessions with Arash but has not done so for several years. He also reports difficulty in standing for prolonged periods.      Subjective      Review of Systems:   Review of Systems   Constitutional:  Negative for fatigue and fever.   HENT:  Negative for congestion and ear pain.    Respiratory:  Negative for apnea, cough, chest tightness and shortness of breath.    Cardiovascular:  Negative for chest pain.   Gastrointestinal:  Negative for abdominal pain, constipation, diarrhea and nausea.   Musculoskeletal:  Positive for arthralgias and gait problem.   Psychiatric/Behavioral:  Negative for depressed mood and stress.        Past Medical History:   Past Medical History:   Diagnosis Date    Acute midline low back pain without sciatica     BPH (benign prostatic hyperplasia)     Chronic bilateral low back pain without sciatica     Chronic sinusitis     hx chronic sinusitis and nasal polyps     Class 1 obesity due to excess calories with serious comorbidity and body mass index (BMI) of 30.0 to 30.9 in adult     Cluster headaches     resolved as he got older    Essential hypertension     Frequent falls     Glaucoma     H/O oral aphthous ulcers     Recurrent apthous ulcers    Hemorrhoids     hx of    History of poliomyelitis     Hyperlipidemia     meds    Hypertension     meds    Insomnia     Megaloblastic anemia     hx of    Mild episode of recurrent major depressive disorder     Mixed hyperlipidemia     Neuropathy     Obstructive sleep apnea syndrome     CPAP, not successful using so far    Polio     hx polio    PUD (peptic ulcer disease)     hx PUD with H pylori - s/p antibiotic tx for H pylori    Shoulder arthritis        Past Surgical History: No past surgical history on file.    Family History:   Family History   Problem Relation Age of Onset    Asthma Mother     Hypertension Father        Social History:   Social History     Socioeconomic History    Marital status:    Tobacco Use    Smoking status: Never    Smokeless tobacco: Never    Tobacco comments:     Tobacco/Vaping use   Vaping Use    Vaping status: Never Used   Substance and Sexual Activity    Alcohol use: Not Currently    Drug use: Defer    Sexual activity: Defer       Medications:     Current Outpatient Medications:     amLODIPine (NORVASC) 5 MG tablet, Take 1 tablet by mouth Daily., Disp: 90 tablet, Rfl: 3    bumetanide (BUMEX) 1 MG tablet, TAKE 1 TABLET BY MOUTH DAILY, Disp: 90 tablet, Rfl: 0    buPROPion XL (Wellbutrin XL) 150 MG 24 hr tablet, Take 1 tablet by mouth Daily., Disp: 60 tablet, Rfl: 5    gabapentin (NEURONTIN) 100 MG capsule, TAKE 1 CAPSULE BY MOUTH 3 TIMES A DAY, Disp: 90 capsule, Rfl: 1    losartan (COZAAR) 100 MG tablet, Take 1 tablet by mouth Daily., Disp: 90 tablet, Rfl: 3    metoprolol tartrate (LOPRESSOR) 25 MG tablet, Take 1 tablet by mouth 2 (Two) Times a Day., Disp: 180 tablet, Rfl: 1    nabumetone (RELAFEN)  "750 MG tablet, TAKE 1 TABLET BY MOUTH 2 TIMES A DAY, Disp: 180 tablet, Rfl: 1    oxybutynin XL (DITROPAN-XL) 10 MG 24 hr tablet, Take 1 tablet by mouth Daily., Disp: 90 tablet, Rfl: 1    pantoprazole (PROTONIX) 40 MG EC tablet, Take 1 tablet by mouth 2 (Two) Times a Day., Disp: 180 tablet, Rfl: 1    potassium chloride ER (K-TAB) 20 MEQ tablet controlled-release ER tablet, Take 1 tablet by mouth Daily. with food., Disp: 90 tablet, Rfl: 1    rosuvastatin (CRESTOR) 10 MG tablet, TAKE 1 TABLET BY MOUTH DAILY, Disp: 90 tablet, Rfl: 1    tamsulosin (FLOMAX) 0.4 MG capsule 24 hr capsule, Take 1 capsule by mouth Daily., Disp: 90 capsule, Rfl: 1    Allergies:   No Known Allergies    Objective     Physical Exam:  Vital Signs:   Vitals:    02/05/25 1050   BP: 140/80   Pulse: 82   SpO2: 97%   Weight: 91.6 kg (202 lb)   Height: 165.1 cm (65\")     Body mass index is 33.61 kg/m².     Physical Exam  Vitals and nursing note reviewed.   Constitutional:       General: He is not in acute distress.     Appearance: Normal appearance. He is not ill-appearing.   HENT:      Head: Normocephalic and atraumatic.      Right Ear: Tympanic membrane and ear canal normal.      Left Ear: Tympanic membrane and ear canal normal.      Nose: Nose normal.   Cardiovascular:      Rate and Rhythm: Normal rate and regular rhythm.      Heart sounds: Normal heart sounds.   Pulmonary:      Effort: Pulmonary effort is normal.      Breath sounds: Normal breath sounds.   Neurological:      Mental Status: He is alert and oriented to person, place, and time. Mental status is at baseline.   Psychiatric:         Mood and Affect: Mood normal.       Physical Exam  There is weakness in the muscle of the left shoulder.    Procedures    Results    Assessment / Plan      Assessment/Plan:   Diagnoses and all orders for this visit:    1. Chronic right shoulder pain (Primary)  -     Ambulatory Referral to Physical Therapy for Evaluation & Treatment    2. Post-polio syndrome  - "     Ambulatory Referral to Physical Therapy for Evaluation & Treatment    3. Unsteady gait  -     Ambulatory Referral to Physical Therapy for Evaluation & Treatment       Assessment & Plan  1. Left shoulder pain.  The etiology of the pain is multifactorial, likely due to a combination of arthritis and nerve damage sustained in the past. A referral for physical therapy has been initiated to address the issue. He will follow up with the physical therapist, Arash, whom he has seen in the past. If physical therapy does not alleviate the symptoms, other interventions will be considered.        Follow Up:   No follow-ups on file.    Carroll Malone  [facility]     Patient or patient representative verbalized consent for the use of Ambient Listening during the visit with  Carroll Malone MD for chart documentation. 2/5/2025  12:34 EST

## 2025-02-27 ENCOUNTER — APPOINTMENT (OUTPATIENT)
Dept: GENERAL RADIOLOGY | Facility: HOSPITAL | Age: 85
End: 2025-02-27
Payer: MEDICARE

## 2025-02-27 ENCOUNTER — APPOINTMENT (OUTPATIENT)
Dept: CT IMAGING | Facility: HOSPITAL | Age: 85
End: 2025-02-27
Payer: MEDICARE

## 2025-02-27 ENCOUNTER — HOSPITAL ENCOUNTER (EMERGENCY)
Facility: HOSPITAL | Age: 85
Discharge: HOME | End: 2025-02-27
Attending: EMERGENCY MEDICINE
Payer: MEDICARE

## 2025-02-27 VITALS
OXYGEN SATURATION: 100 % | TEMPERATURE: 98 F | BODY MASS INDEX: 32.14 KG/M2 | HEIGHT: 66 IN | WEIGHT: 200 LBS | DIASTOLIC BLOOD PRESSURE: 79 MMHG | SYSTOLIC BLOOD PRESSURE: 164 MMHG | RESPIRATION RATE: 16 BRPM | HEART RATE: 64 BPM

## 2025-02-27 DIAGNOSIS — R41.82 ALTERED MENTAL STATUS, UNSPECIFIED ALTERED MENTAL STATUS TYPE: Primary | ICD-10-CM

## 2025-02-27 DIAGNOSIS — I10 HYPERTENSION, UNSPECIFIED TYPE: ICD-10-CM

## 2025-02-27 DIAGNOSIS — N28.9 RENAL INSUFFICIENCY: ICD-10-CM

## 2025-02-27 LAB
ALBUMIN SERPL-MCNC: 3.5 G/DL (ref 3.5–5.2)
ALBUMIN/GLOB SERPL: 1.3 G/DL
ALP SERPL-CCNC: 68 U/L (ref 39–117)
ALT SERPL W P-5'-P-CCNC: 13 U/L (ref 1–41)
ANION GAP SERPL CALCULATED.3IONS-SCNC: 11 MMOL/L (ref 5–15)
AST SERPL-CCNC: 14 U/L (ref 1–40)
BASOPHILS # BLD AUTO: 0.03 10*3/MM3 (ref 0–0.2)
BASOPHILS NFR BLD AUTO: 0.5 % (ref 0–1.5)
BILIRUB SERPL-MCNC: 0.3 MG/DL (ref 0–1.2)
BILIRUB UR QL STRIP: NEGATIVE
BUN SERPL-MCNC: 21 MG/DL (ref 8–23)
BUN/CREAT SERPL: 15.7 (ref 7–25)
CALCIUM SPEC-SCNC: 8.8 MG/DL (ref 8.6–10.5)
CHLORIDE SERPL-SCNC: 107 MMOL/L (ref 98–107)
CLARITY UR: CLEAR
CO2 SERPL-SCNC: 22 MMOL/L (ref 22–29)
COLOR UR: YELLOW
CREAT SERPL-MCNC: 1.34 MG/DL (ref 0.76–1.27)
DEPRECATED RDW RBC AUTO: 48.4 FL (ref 37–54)
EGFRCR SERPLBLD CKD-EPI 2021: 51.6 ML/MIN/1.73
EOSINOPHIL # BLD AUTO: 0.16 10*3/MM3 (ref 0–0.4)
EOSINOPHIL NFR BLD AUTO: 2.5 % (ref 0.3–6.2)
ERYTHROCYTE [DISTWIDTH] IN BLOOD BY AUTOMATED COUNT: 14.9 % (ref 12.3–15.4)
FLUAV RNA RESP QL NAA+PROBE: NOT DETECTED
FLUBV RNA RESP QL NAA+PROBE: NOT DETECTED
GEN 5 1HR TROPONIN T REFLEX: 37 NG/L
GLOBULIN UR ELPH-MCNC: 2.7 GM/DL
GLUCOSE SERPL-MCNC: 107 MG/DL (ref 65–99)
GLUCOSE UR STRIP-MCNC: NEGATIVE MG/DL
HCT VFR BLD AUTO: 31.5 % (ref 37.5–51)
HGB BLD-MCNC: 10.8 G/DL (ref 13–17.7)
HGB UR QL STRIP.AUTO: NEGATIVE
HOLD SPECIMEN: NORMAL
IMM GRANULOCYTES # BLD AUTO: 0.02 10*3/MM3 (ref 0–0.05)
IMM GRANULOCYTES NFR BLD AUTO: 0.3 % (ref 0–0.5)
KETONES UR QL STRIP: ABNORMAL
LEUKOCYTE ESTERASE UR QL STRIP.AUTO: NEGATIVE
LYMPHOCYTES # BLD AUTO: 1.22 10*3/MM3 (ref 0.7–3.1)
LYMPHOCYTES NFR BLD AUTO: 19.4 % (ref 19.6–45.3)
MAGNESIUM SERPL-MCNC: 2.2 MG/DL (ref 1.6–2.4)
MCH RBC QN AUTO: 30.7 PG (ref 26.6–33)
MCHC RBC AUTO-ENTMCNC: 34.3 G/DL (ref 31.5–35.7)
MCV RBC AUTO: 89.5 FL (ref 79–97)
MONOCYTES # BLD AUTO: 0.63 10*3/MM3 (ref 0.1–0.9)
MONOCYTES NFR BLD AUTO: 10 % (ref 5–12)
NEUTROPHILS NFR BLD AUTO: 4.23 10*3/MM3 (ref 1.7–7)
NEUTROPHILS NFR BLD AUTO: 67.3 % (ref 42.7–76)
NITRITE UR QL STRIP: NEGATIVE
NRBC BLD AUTO-RTO: 0 /100 WBC (ref 0–0.2)
PH UR STRIP.AUTO: 6 [PH] (ref 5–8)
PLATELET # BLD AUTO: 111 10*3/MM3 (ref 140–450)
PMV BLD AUTO: 11.2 FL (ref 6–12)
POTASSIUM SERPL-SCNC: 4.3 MMOL/L (ref 3.5–5.2)
PROT SERPL-MCNC: 6.2 G/DL (ref 6–8.5)
PROT UR QL STRIP: ABNORMAL
RBC # BLD AUTO: 3.52 10*6/MM3 (ref 4.14–5.8)
SARS-COV-2 RNA RESP QL NAA+PROBE: NOT DETECTED
SODIUM SERPL-SCNC: 140 MMOL/L (ref 136–145)
SP GR UR STRIP: 1.02 (ref 1–1.03)
TROPONIN T % DELTA: -5
TROPONIN T NUMERIC DELTA: -2 NG/L
TROPONIN T SERPL HS-MCNC: 39 NG/L
UROBILINOGEN UR QL STRIP: ABNORMAL
WBC NRBC COR # BLD AUTO: 6.29 10*3/MM3 (ref 3.4–10.8)
WHOLE BLOOD HOLD COAG: NORMAL
WHOLE BLOOD HOLD SPECIMEN: NORMAL

## 2025-02-27 PROCEDURE — 83735 ASSAY OF MAGNESIUM: CPT | Performed by: EMERGENCY MEDICINE

## 2025-02-27 PROCEDURE — 80053 COMPREHEN METABOLIC PANEL: CPT | Performed by: EMERGENCY MEDICINE

## 2025-02-27 PROCEDURE — 36415 COLL VENOUS BLD VENIPUNCTURE: CPT

## 2025-02-27 PROCEDURE — 99284 EMERGENCY DEPT VISIT MOD MDM: CPT

## 2025-02-27 PROCEDURE — 71045 X-RAY EXAM CHEST 1 VIEW: CPT

## 2025-02-27 PROCEDURE — 93005 ELECTROCARDIOGRAM TRACING: CPT | Performed by: EMERGENCY MEDICINE

## 2025-02-27 PROCEDURE — 84484 ASSAY OF TROPONIN QUANT: CPT | Performed by: EMERGENCY MEDICINE

## 2025-02-27 PROCEDURE — 85025 COMPLETE CBC W/AUTO DIFF WBC: CPT | Performed by: EMERGENCY MEDICINE

## 2025-02-27 PROCEDURE — 25810000003 SODIUM CHLORIDE 0.9 % SOLUTION: Performed by: PHYSICIAN ASSISTANT

## 2025-02-27 PROCEDURE — 70450 CT HEAD/BRAIN W/O DYE: CPT

## 2025-02-27 PROCEDURE — 87636 SARSCOV2 & INF A&B AMP PRB: CPT | Performed by: PHYSICIAN ASSISTANT

## 2025-02-27 PROCEDURE — 81003 URINALYSIS AUTO W/O SCOPE: CPT | Performed by: EMERGENCY MEDICINE

## 2025-02-27 RX ORDER — SODIUM CHLORIDE 0.9 % (FLUSH) 0.9 %
10 SYRINGE (ML) INJECTION AS NEEDED
Status: DISCONTINUED | OUTPATIENT
Start: 2025-02-27 | End: 2025-02-27 | Stop reason: HOSPADM

## 2025-02-27 RX ADMIN — SODIUM CHLORIDE 500 ML: 900 INJECTION, SOLUTION INTRAVENOUS at 15:13

## 2025-02-27 NOTE — ED PROVIDER NOTES
"Subjective   History of Present Illness  Pt is a 85 yo male presenting to ED by EMS with reports of AMS. PMHx significant for HTN, HLD, Polio, Glaucoma, BPH, AMANUEL, PUD and CBP. Pt denies any complaints in ED and states, \"I just didn't feel good last night and couldn't sleep\" but does not provide more details. He is AxOx4. He denies headache, dizziness, fever, neck pain, vision changes, weakness, numbness, CP, SOB, cough, abdominal pain, V/D or urinary sx. Denies recent admission, antibiotics or medication changes. Denies tobacco, drug or ETOH use.     Family arrived to ED and explains he was lethargic and acting confused earlier today when they went to go check on him. He explains he hasn't eaten today and didn't sleep last night so was just tired. Family confirms he is back to baseline currently in ED.     History provided by:  Patient, medical records, relative and EMS personnel      Review of Systems   Constitutional:  Positive for fatigue. Negative for activity change, appetite change and fever.   HENT:  Negative for congestion.    Respiratory:  Negative for cough and shortness of breath.    Cardiovascular:  Negative for chest pain.   Gastrointestinal:  Negative for abdominal pain, diarrhea and vomiting.   Genitourinary:  Negative for difficulty urinating, dysuria, flank pain and frequency.   Musculoskeletal:  Negative for arthralgias, back pain and neck pain.   Neurological:  Negative for dizziness, syncope, weakness, light-headedness and headaches.   Psychiatric/Behavioral:  Positive for confusion (? patient denies) and sleep disturbance.        Past Medical History:   Diagnosis Date    Acute midline low back pain without sciatica     BPH (benign prostatic hyperplasia)     Chronic bilateral low back pain without sciatica     Chronic sinusitis     hx chronic sinusitis and nasal polyps    Class 1 obesity due to excess calories with serious comorbidity and body mass index (BMI) of 30.0 to 30.9 in adult     Cluster " headaches     resolved as he got older    Essential hypertension     Frequent falls     Glaucoma     H/O oral aphthous ulcers     Recurrent apthous ulcers    Hemorrhoids     hx of    History of poliomyelitis     Hyperlipidemia     meds    Hypertension     meds    Insomnia     Megaloblastic anemia     hx of    Mild episode of recurrent major depressive disorder     Mixed hyperlipidemia     Neuropathy     Obstructive sleep apnea syndrome     CPAP, not successful using so far    Polio     hx polio    PUD (peptic ulcer disease)     hx PUD with H pylori - s/p antibiotic tx for H pylori    Shoulder arthritis        No Known Allergies    No past surgical history on file.    Family History   Problem Relation Age of Onset    Asthma Mother     Hypertension Father        Social History     Socioeconomic History    Marital status:    Tobacco Use    Smoking status: Never    Smokeless tobacco: Never    Tobacco comments:     Tobacco/Vaping use   Vaping Use    Vaping status: Never Used   Substance and Sexual Activity    Alcohol use: Not Currently    Drug use: Defer    Sexual activity: Defer           Objective   Physical Exam  Vitals and nursing note reviewed.   Constitutional:       Appearance: He is well-developed.   HENT:      Head: Atraumatic.      Nose: Nose normal.   Eyes:      General: Lids are normal.      Conjunctiva/sclera: Conjunctivae normal.      Pupils: Pupils are equal, round, and reactive to light.   Cardiovascular:      Rate and Rhythm: Normal rate and regular rhythm.      Heart sounds: Normal heart sounds.   Pulmonary:      Effort: Pulmonary effort is normal.      Breath sounds: Normal breath sounds.   Abdominal:      General: There is no distension.      Palpations: Abdomen is soft.      Tenderness: There is no abdominal tenderness. There is no guarding or rebound.   Musculoskeletal:         General: No tenderness or deformity. Normal range of motion.      Cervical back: Normal range of motion and neck  supple.      Comments: Chronic brace left leg from polio    Skin:     General: Skin is warm and dry.   Neurological:      Mental Status: He is alert and oriented to person, place, and time.      Cranial Nerves: Cranial nerves 2-12 are intact.      Sensory: Sensation is intact. No sensory deficit.      Motor: Motor function is intact.   Psychiatric:         Behavior: Behavior normal.         Procedures           ED Course  ED Course as of 02/27/25 1602   Thu Feb 27, 2025   1322 I personally reviewed and interpreted labs results and went over with patient.   I personally reviewed and interpreted vitals.   [RT]   1406 EKG personally reviewed and interpreted with findings of NSR with rate 68.  [RT]   1550 Discussed patient with Dr. Munson who is agreeable with ED course and tx plan.  [RT]   1553 WBC: 6.29 [RT]   1553 Hemoglobin(!): 10.8 [RT]   1553 Hematocrit(!): 31.5  Stable H and H [RT]   1553 HS Troponin T(!): 39 [RT]   1553 HS Troponin T(!): 37 [RT]   1553 Glucose(!): 107 [RT]   1553 Creatinine(!): 1.34 [RT]   1553 Potassium: 4.3 [RT]   1554 Anion Gap: 11.0 [RT]   1554 Leukocytes, UA: Negative [RT]   1554 Nitrite, UA: Negative [RT]   1554 I personally and independently reviewed CT head and CXR and agree with the radiology interpretation specifically no acute findings.  [RT]   1559 Re-examined patient and updated patient / family on results and tx plan. Patient continues to deny any complaints in ED and is eager to be discharged home. Pt give fluids in ED and encouraged to eat / drink regularly. He reports he only eats 1-2 meals per day. Observed in ED for near 3 hours and no acute confusion in ED. [RT]      ED Course User Index  [RT] Alyson Chin PA      Recent Results (from the past 24 hours)   Comprehensive Metabolic Panel    Collection Time: 02/27/25  1:27 PM    Specimen: Blood   Result Value Ref Range    Glucose 107 (H) 65 - 99 mg/dL    BUN 21 8 - 23 mg/dL    Creatinine 1.34 (H) 0.76 - 1.27 mg/dL    Sodium 140  136 - 145 mmol/L    Potassium 4.3 3.5 - 5.2 mmol/L    Chloride 107 98 - 107 mmol/L    CO2 22.0 22.0 - 29.0 mmol/L    Calcium 8.8 8.6 - 10.5 mg/dL    Total Protein 6.2 6.0 - 8.5 g/dL    Albumin 3.5 3.5 - 5.2 g/dL    ALT (SGPT) 13 1 - 41 U/L    AST (SGOT) 14 1 - 40 U/L    Alkaline Phosphatase 68 39 - 117 U/L    Total Bilirubin 0.3 0.0 - 1.2 mg/dL    Globulin 2.7 gm/dL    A/G Ratio 1.3 g/dL    BUN/Creatinine Ratio 15.7 7.0 - 25.0    Anion Gap 11.0 5.0 - 15.0 mmol/L    eGFR 51.6 (L) >60.0 mL/min/1.73   High Sensitivity Troponin T    Collection Time: 02/27/25  1:27 PM    Specimen: Blood   Result Value Ref Range    HS Troponin T 39 (H) <22 ng/L   Magnesium    Collection Time: 02/27/25  1:27 PM    Specimen: Blood   Result Value Ref Range    Magnesium 2.2 1.6 - 2.4 mg/dL   Green Top (Gel)    Collection Time: 02/27/25  1:27 PM   Result Value Ref Range    Extra Tube Hold for add-ons.    Lavender Top    Collection Time: 02/27/25  1:27 PM   Result Value Ref Range    Extra Tube hold for add-on    Gold Top - SST    Collection Time: 02/27/25  1:27 PM   Result Value Ref Range    Extra Tube Hold for add-ons.    Gray Top    Collection Time: 02/27/25  1:27 PM   Result Value Ref Range    Extra Tube Hold for add-ons.    Light Blue Top    Collection Time: 02/27/25  1:27 PM   Result Value Ref Range    Extra Tube Hold for add-ons.    CBC Auto Differential    Collection Time: 02/27/25  1:27 PM    Specimen: Blood   Result Value Ref Range    WBC 6.29 3.40 - 10.80 10*3/mm3    RBC 3.52 (L) 4.14 - 5.80 10*6/mm3    Hemoglobin 10.8 (L) 13.0 - 17.7 g/dL    Hematocrit 31.5 (L) 37.5 - 51.0 %    MCV 89.5 79.0 - 97.0 fL    MCH 30.7 26.6 - 33.0 pg    MCHC 34.3 31.5 - 35.7 g/dL    RDW 14.9 12.3 - 15.4 %    RDW-SD 48.4 37.0 - 54.0 fl    MPV 11.2 6.0 - 12.0 fL    Platelets 111 (L) 140 - 450 10*3/mm3    Neutrophil % 67.3 42.7 - 76.0 %    Lymphocyte % 19.4 (L) 19.6 - 45.3 %    Monocyte % 10.0 5.0 - 12.0 %    Eosinophil % 2.5 0.3 - 6.2 %    Basophil % 0.5 0.0  - 1.5 %    Immature Grans % 0.3 0.0 - 0.5 %    Neutrophils, Absolute 4.23 1.70 - 7.00 10*3/mm3    Lymphocytes, Absolute 1.22 0.70 - 3.10 10*3/mm3    Monocytes, Absolute 0.63 0.10 - 0.90 10*3/mm3    Eosinophils, Absolute 0.16 0.00 - 0.40 10*3/mm3    Basophils, Absolute 0.03 0.00 - 0.20 10*3/mm3    Immature Grans, Absolute 0.02 0.00 - 0.05 10*3/mm3    nRBC 0.0 0.0 - 0.2 /100 WBC   COVID-19 and FLU A/B PCR, 1 HR TAT - Swab, Nasopharynx    Collection Time: 02/27/25  1:27 PM    Specimen: Nasopharynx; Swab   Result Value Ref Range    COVID19 Not Detected Not Detected - Ref. Range    Influenza A PCR Not Detected Not Detected    Influenza B PCR Not Detected Not Detected   ECG 12 Lead ED Triage Standing Order; Weak / Dizzy / AMS    Collection Time: 02/27/25  2:00 PM   Result Value Ref Range    QT Interval 392 ms    QTC Interval 416 ms   Urinalysis With Microscopic If Indicated (No Culture) - Urine, Clean Catch    Collection Time: 02/27/25  2:51 PM    Specimen: Urine, Clean Catch   Result Value Ref Range    Color, UA Yellow Yellow, Straw    Appearance, UA Clear Clear    pH, UA 6.0 5.0 - 8.0    Specific Gravity, UA 1.024 1.001 - 1.030    Glucose, UA Negative Negative    Ketones, UA Trace (A) Negative    Bilirubin, UA Negative Negative    Blood, UA Negative Negative    Protein, UA Trace (A) Negative    Leuk Esterase, UA Negative Negative    Nitrite, UA Negative Negative    Urobilinogen, UA 1.0 E.U./dL 0.2 - 1.0 E.U./dL   High Sensitivity Troponin T 1Hr    Collection Time: 02/27/25  2:57 PM    Specimen: Arm, Right; Blood   Result Value Ref Range    HS Troponin T 37 (H) <22 ng/L    Troponin T Numeric Delta -2 ng/L    Troponin T % Delta -5 Abnormal if >/= 20%     Note: In addition to lab results from this visit, the labs listed above may include labs taken at another facility or during a different encounter within the last 24 hours. Please correlate lab times with ED admission and discharge times for further clarification of the  "services performed during this visit.    CT Head Without Contrast   Final Result   Impression:   1.No acute intracranial abnormality identified.   2.Diffuse brain atrophy.   3.Chronic small vessel ischemia.            Electronically Signed: Man De León MD     2/27/2025 3:02 PM EST     Workstation ID: [id number]      XR Chest 1 View   Final Result   Impression:   1.No acute radiographic abnormality is identified.         Electronically Signed: Josh Matthews MD     2/27/2025 2:35 PM EST     Workstation ID: [id number]        Vitals:    02/27/25 1320 02/27/25 1515 02/27/25 1529   BP: 126/67 122/97 141/88   BP Location: Right arm     Patient Position: Sitting     Pulse: 78 62 61   Resp: 16     Temp: 98 °F (36.7 °C)     TempSrc: Oral     SpO2: 99% 97% 95%   Weight: 90.7 kg (200 lb)     Height: 167.6 cm (66\")       Medications   sodium chloride 0.9 % flush 10 mL (has no administration in time range)   sodium chloride 0.9 % bolus 500 mL (500 mL Intravenous New Bag 2/27/25 1513)     ECG/EMG Results (last 24 hours)       ** No results found for the last 24 hours. **          ECG 12 Lead ED Triage Standing Order; Weak / Dizzy / AMS   Preliminary Result   Test Reason : ED Triage Standing Order~   Blood Pressure :   */*   mmHG   Vent. Rate :  68 BPM     Atrial Rate :  68 BPM      P-R Int : 174 ms          QRS Dur :  74 ms       QT Int : 392 ms       P-R-T Axes :  45  -6  18 degrees     QTcB Int : 416 ms      ** Poor data quality, interpretation may be adversely affected   Normal sinus rhythm   Normal ECG   No previous ECGs available      Referred By: EDMD           Confirmed By:                                                            Medical Decision Making  Pt is a 85 yo male presenting to ED with complaints of episode of confusion earlier today.  I had a discussion with the patient / family regarding ED course, diagnosis, diagnostic results and treatment plan including medications and admission / discharge. Discussed if " new or worse symptoms / concerns to return to ED for further evaluation. Discussed need for close follow up with PCP / specialists.     DDx  CVA, Pneumonia, Covid, Flu, RENALDO, Dehydration, ACS, NSTEMI, CHF, Electrolyte abnormality     Problems Addressed:  Altered mental status, unspecified altered mental status type: complicated acute illness or injury  Hypertension, unspecified type: complicated acute illness or injury  Renal insufficiency: complicated acute illness or injury    Amount and/or Complexity of Data Reviewed  Independent Historian:      Details: sister  External Data Reviewed: notes.     Details: Reviewed previous non ED visits including prior labs, imaging, available notes, medications, allergies and surgical hx.   2-5-25 PCP for shoulder pain  Labs: ordered. Decision-making details documented in ED Course.  Radiology: ordered and independent interpretation performed. Decision-making details documented in ED Course.  ECG/medicine tests: ordered and independent interpretation performed. Decision-making details documented in ED Course.    Risk  Prescription drug management.        Final diagnoses:   Altered mental status, unspecified altered mental status type   Renal insufficiency   Hypertension, unspecified type       ED Disposition  ED Disposition       ED Disposition   Discharge    Condition   Stable    Comment   --               Carroll Malone MD  [address] [facility] [address]  [phone]    Schedule an appointment as soon as possible for a visit       [facility] EMERGENCY DEPARTMENT  [address] [facility] [address] [phone]    If symptoms worsen         Medication List      No changes were made to your prescriptions during this visit.            Alyson Chin PA  02/27/25 [id number]

## 2025-03-03 LAB
QT INTERVAL: 392 MS
QTC INTERVAL: 416 MS

## 2025-03-05 ENCOUNTER — OFFICE VISIT (OUTPATIENT)
Dept: FAMILY MEDICINE CLINIC | Facility: CLINIC | Age: 85
End: 2025-03-05
Payer: MEDICARE

## 2025-03-05 VITALS
OXYGEN SATURATION: 96 % | HEART RATE: 88 BPM | WEIGHT: 209 LBS | HEIGHT: 66 IN | BODY MASS INDEX: 33.59 KG/M2 | SYSTOLIC BLOOD PRESSURE: 160 MMHG | DIASTOLIC BLOOD PRESSURE: 80 MMHG

## 2025-03-05 DIAGNOSIS — M25.511 CHRONIC RIGHT SHOULDER PAIN: ICD-10-CM

## 2025-03-05 DIAGNOSIS — G89.29 CHRONIC RIGHT SHOULDER PAIN: ICD-10-CM

## 2025-03-05 DIAGNOSIS — R41.82 ALTERED MENTAL STATUS, UNSPECIFIED ALTERED MENTAL STATUS TYPE: Primary | ICD-10-CM

## 2025-03-05 DIAGNOSIS — Z86.12 HISTORY OF POLIOMYELITIS: ICD-10-CM

## 2025-03-05 DIAGNOSIS — D64.9 ANEMIA, UNSPECIFIED TYPE: ICD-10-CM

## 2025-03-05 RX ORDER — PREDNISONE 20 MG/1
TABLET ORAL
Qty: 10 TABLET | Refills: 0 | Status: SHIPPED | OUTPATIENT
Start: 2025-03-05

## 2025-03-06 LAB
BASOPHILS # BLD AUTO: 0 X10E3/UL (ref 0–0.2)
BASOPHILS NFR BLD AUTO: 1 %
EOSINOPHIL # BLD AUTO: 0.3 X10E3/UL (ref 0–0.4)
EOSINOPHIL NFR BLD AUTO: 5 %
ERYTHROCYTE [DISTWIDTH] IN BLOOD BY AUTOMATED COUNT: 14.1 % (ref 11.6–15.4)
FERRITIN SERPL-MCNC: 326 NG/ML (ref 30–400)
HCT VFR BLD AUTO: 33 % (ref 37.5–51)
HGB BLD-MCNC: 11.1 G/DL (ref 13–17.7)
IMM GRANULOCYTES # BLD AUTO: 0 X10E3/UL (ref 0–0.1)
IMM GRANULOCYTES NFR BLD AUTO: 1 %
IRON SATN MFR SERPL: 35 % (ref 15–55)
IRON SERPL-MCNC: 66 UG/DL (ref 38–169)
LYMPHOCYTES # BLD AUTO: 1.5 X10E3/UL (ref 0.7–3.1)
LYMPHOCYTES NFR BLD AUTO: 24 %
MCH RBC QN AUTO: 31 PG (ref 26.6–33)
MCHC RBC AUTO-ENTMCNC: 33.6 G/DL (ref 31.5–35.7)
MCV RBC AUTO: 92 FL (ref 79–97)
MONOCYTES # BLD AUTO: 0.6 X10E3/UL (ref 0.1–0.9)
MONOCYTES NFR BLD AUTO: 9 %
NEUTROPHILS # BLD AUTO: 3.8 X10E3/UL (ref 1.4–7)
NEUTROPHILS NFR BLD AUTO: 60 %
PLATELET # BLD AUTO: 139 X10E3/UL (ref 150–450)
RBC # BLD AUTO: 3.58 X10E6/UL (ref 4.14–5.8)
TIBC SERPL-MCNC: 191 UG/DL (ref 250–450)
UIBC SERPL-MCNC: 125 UG/DL (ref 111–343)
VIT B12 SERPL-MCNC: 344 PG/ML (ref 232–1245)
WBC # BLD AUTO: 6.3 X10E3/UL (ref 3.4–10.8)

## 2025-03-06 NOTE — PROGRESS NOTES
Follow Up Office Visit      Date of Visit:  2025   Patient Name: Carroll Frost  : 1940   MRN: [id number]     Chief Complaint:    Chief Complaint   Patient presents with    ER FOLLOW UP       History of Present Illness: Carroll Frost is a 84 y.o. male who is here today for follow up.    History of Present Illness  The patient presents for evaluation of memory loss, shoulder pain, and anemia.    He was recently admitted to the hospital due to abnormal behavior, which he does not recall. He was observed to return to his baseline after approximately 5 to 6 hours. He reports no unusual incidents this week and has been engaging in regular activities such as visiting the [place] [facility]. He has a history of polio and uses a cane for mobility, although he does not consistently use a walker. He has not participated in physical therapy or taken Advil.    He is experiencing severe shoulder pain, which he attributes to a fall at home. He has had 2 falls, resulting in difficulty raising his arm and stiffness. He also reports persistent numbness in his hand.      Subjective      Review of Systems:   Review of Systems    Past Medical History:   Past Medical History:   Diagnosis Date    Acute midline low back pain without sciatica     BPH (benign prostatic hyperplasia)     Chronic bilateral low back pain without sciatica     Chronic sinusitis     hx chronic sinusitis and nasal polyps    Class 1 obesity due to excess calories with serious comorbidity and body mass index (BMI) of 30.0 to 30.9 in adult     Cluster headaches     resolved as he got older    Essential hypertension     Frequent falls     Glaucoma     H/O oral aphthous ulcers     Recurrent apthous ulcers    Hemorrhoids     hx of    History of poliomyelitis     Hyperlipidemia     meds    Hypertension     meds    Insomnia     Megaloblastic anemia     hx of    Mild episode of recurrent major depressive disorder     Mixed hyperlipidemia     Neuropathy      Obstructive sleep apnea syndrome     CPAP, not successful using so far    Polio     hx polio    PUD (peptic ulcer disease)     hx PUD with H pylori - s/p antibiotic tx for H pylori    Shoulder arthritis        Past Surgical History: No past surgical history on file.    Family History:   Family History   Problem Relation Age of Onset    Asthma Mother     Hypertension Father        Social History:   Social History     Socioeconomic History    Marital status:    Tobacco Use    Smoking status: Never    Smokeless tobacco: Never    Tobacco comments:     Tobacco/Vaping use   Vaping Use    Vaping status: Never Used   Substance and Sexual Activity    Alcohol use: Not Currently    Drug use: Defer    Sexual activity: Defer       Medications:     Current Outpatient Medications:     amLODIPine (NORVASC) 5 MG tablet, Take 1 tablet by mouth Daily., Disp: 90 tablet, Rfl: 3    bumetanide (BUMEX) 1 MG tablet, TAKE 1 TABLET BY MOUTH DAILY, Disp: 90 tablet, Rfl: 0    buPROPion XL (Wellbutrin XL) 150 MG 24 hr tablet, Take 1 tablet by mouth Daily., Disp: 60 tablet, Rfl: 5    gabapentin (NEURONTIN) 100 MG capsule, TAKE 1 CAPSULE BY MOUTH 3 TIMES A DAY, Disp: 90 capsule, Rfl: 1    losartan (COZAAR) 100 MG tablet, Take 1 tablet by mouth Daily., Disp: 90 tablet, Rfl: 3    metoprolol tartrate (LOPRESSOR) 25 MG tablet, Take 1 tablet by mouth 2 (Two) Times a Day., Disp: 180 tablet, Rfl: 1    nabumetone (RELAFEN) 750 MG tablet, TAKE 1 TABLET BY MOUTH 2 TIMES A DAY, Disp: 180 tablet, Rfl: 1    oxybutynin XL (DITROPAN-XL) 10 MG 24 hr tablet, Take 1 tablet by mouth Daily., Disp: 90 tablet, Rfl: 1    pantoprazole (PROTONIX) 40 MG EC tablet, Take 1 tablet by mouth 2 (Two) Times a Day., Disp: 180 tablet, Rfl: 1    potassium chloride ER (K-TAB) 20 MEQ tablet controlled-release ER tablet, Take 1 tablet by mouth Daily. with food., Disp: 90 tablet, Rfl: 1    predniSONE (DELTASONE) 20 MG tablet, 2 PO DAILY FOR 5 DAYS., Disp: 10 tablet, Rfl: 0     "rosuvastatin (CRESTOR) 10 MG tablet, TAKE 1 TABLET BY MOUTH DAILY, Disp: 90 tablet, Rfl: 1    tamsulosin (FLOMAX) 0.4 MG capsule 24 hr capsule, Take 1 capsule by mouth Daily., Disp: 90 capsule, Rfl: 1    Allergies:   No Known Allergies    Objective     Physical Exam:  Vital Signs:   Vitals:    03/05/25 1305   BP: 160/80   Pulse: 88   SpO2: 96%   Weight: 94.8 kg (209 lb)   Height: 167.6 cm (66\")     Body mass index is 33.73 kg/m².     Physical Exam  Vitals and nursing note reviewed.   Constitutional:       General: He is not in acute distress.     Appearance: Normal appearance. He is not ill-appearing.   HENT:      Head: Normocephalic and atraumatic.      Right Ear: Tympanic membrane and ear canal normal.      Left Ear: Tympanic membrane and ear canal normal.      Nose: Nose normal.   Cardiovascular:      Rate and Rhythm: Normal rate and regular rhythm.      Heart sounds: Normal heart sounds.   Pulmonary:      Effort: Pulmonary effort is normal.      Breath sounds: Normal breath sounds.   Neurological:      Mental Status: He is alert and oriented to person, place, and time. Mental status is at baseline.   Psychiatric:         Mood and Affect: Mood normal.       Physical Exam      Procedures    Results  Laboratory Studies  Blood counts slightly below normal. Kidney, liver, sugar levels were normal. Urine test showed no infection.    Imaging  CAT scan of brain showed age-related atrophy. Chest x-ray was normal.  Assessment / Plan      Assessment/Plan:   Diagnoses and all orders for this visit:    1. Anemia, unspecified type (Primary)  -     CBC & Differential  -     Vitamin B12  -     Iron and TIBC  -     Ferritin    Other orders  -     predniSONE (DELTASONE) 20 MG tablet; 2 PO DAILY FOR 5 DAYS.  Dispense: 10 tablet; Refill: 0       Assessment & Plan  1. Memory loss.  The patient's memory loss may be attributed to age-related cerebral atrophy, as indicated by the CAT scan results. All other tests conducted during the " hospital visit, including blood counts, kidney, liver, sugar, urine analysis, and chest x-ray, were within normal limits, except for a slight decrease in blood count. Given that the episode has not recurred since the initial incident, no further neurological evaluation or additional testing is deemed necessary at this time. A blood test will be ordered today to monitor his blood counts, iron, and B12 levels.    2. Shoulder pain.  The patient reports significant shoulder pain following a fall, which has resulted in limited range of motion and stiffness. A prescription for prednisone will be provided to alleviate the inflammation and improve shoulder mobility. He is advised to use his walker consistently to prevent further falls. Physical therapy is recommended to enhance strength and balance.    3. Anemia.  The patient has a history of slightly low blood counts, which were noted to be lower than usual during the recent hospital visit. A blood test will be ordered today to recheck his blood counts, iron, and B12 levels to determine if the anemia has improved or requires further intervention.        Follow Up:   No follow-ups on file.    Carroll Malone  [facility] [place]     Patient or patient representative verbalized consent for the use of Ambient Listening during the visit with  Carroll Malone MD for chart documentation. 3/5/2025  20:56 EST

## 2025-03-10 RX ORDER — BUMETANIDE 1 MG/1
1 TABLET ORAL DAILY
Qty: 90 TABLET | Refills: 0 | Status: SHIPPED | OUTPATIENT
Start: 2025-03-10

## 2025-03-31 RX ORDER — ROSUVASTATIN CALCIUM 10 MG/1
10 TABLET, COATED ORAL DAILY
Qty: 30 TABLET | Refills: 0 | Status: SHIPPED | OUTPATIENT
Start: 2025-03-31

## 2025-04-17 RX ORDER — ROSUVASTATIN CALCIUM 10 MG/1
10 TABLET, COATED ORAL DAILY
Qty: 90 TABLET | Refills: 0 | Status: SHIPPED | OUTPATIENT
Start: 2025-04-17

## 2025-04-17 RX ORDER — OXYBUTYNIN CHLORIDE 10 MG/1
10 TABLET, EXTENDED RELEASE ORAL DAILY
Qty: 90 TABLET | Refills: 1 | Status: SHIPPED | OUTPATIENT
Start: 2025-04-17

## 2025-05-06 DIAGNOSIS — E11.43 TYPE 2 DIABETES MELLITUS WITH DIABETIC AUTONOMIC NEUROPATHY, WITHOUT LONG-TERM CURRENT USE OF INSULIN: ICD-10-CM

## 2025-05-06 RX ORDER — GABAPENTIN 100 MG/1
100 CAPSULE ORAL 3 TIMES DAILY
Qty: 90 CAPSULE | Refills: 1 | Status: SHIPPED | OUTPATIENT
Start: 2025-05-06

## 2025-05-09 ENCOUNTER — OFFICE VISIT (OUTPATIENT)
Dept: FAMILY MEDICINE CLINIC | Facility: CLINIC | Age: 85
End: 2025-05-09
Payer: MEDICARE

## 2025-05-09 VITALS
OXYGEN SATURATION: 98 % | BODY MASS INDEX: 33.75 KG/M2 | HEART RATE: 72 BPM | HEIGHT: 66 IN | DIASTOLIC BLOOD PRESSURE: 80 MMHG | SYSTOLIC BLOOD PRESSURE: 130 MMHG | WEIGHT: 210 LBS

## 2025-05-09 DIAGNOSIS — G89.29 CHRONIC RIGHT SHOULDER PAIN: Primary | ICD-10-CM

## 2025-05-09 DIAGNOSIS — M25.511 CHRONIC RIGHT SHOULDER PAIN: Primary | ICD-10-CM

## 2025-05-10 RX ORDER — TRIAMCINOLONE ACETONIDE 40 MG/ML
40 INJECTION, SUSPENSION INTRA-ARTICULAR; INTRAMUSCULAR
Status: COMPLETED | OUTPATIENT
Start: 2025-05-10 | End: 2025-05-10

## 2025-05-10 RX ADMIN — TRIAMCINOLONE ACETONIDE 40 MG: 40 INJECTION, SUSPENSION INTRA-ARTICULAR; INTRAMUSCULAR at 19:57

## 2025-05-10 NOTE — PROGRESS NOTES
Follow Up Office Visit      Date of Visit:  2025   Patient Name: Carroll Frost  : 1940   MRN: [id number]     Chief Complaint:    Chief Complaint   Patient presents with    Shoulder Injury     right       History of Present Illness: Carroll Frost is a 84 y.o. male who is here today for follow up.    History of Present Illness  The patient presents for evaluation of shoulder pain.    He reports experiencing shoulder pain, which he attributes to a minor fall. The pain has been persistent for approximately one month and is accompanied by difficulty in breathing and limited mobility in raising his arm. Despite the administration of a steroid pill during his last visit a few months ago, there has been no significant improvement in his condition. He describes the pain as worsening over time and affecting his ability to perform daily activities. He also mentions that he requires a prescription for his leg brace. Additionally, he expresses feelings of depression due to his current state of immobility and inability to engage in outdoor activities. He attends the [employer] twice weekly on  and . The pain is more pronounced when he attempts to elevate his arm or lie down.      Subjective      Review of Systems:   Review of Systems    Past Medical History:   Past Medical History:   Diagnosis Date    Acute midline low back pain without sciatica     BPH (benign prostatic hyperplasia)     Chronic bilateral low back pain without sciatica     Chronic sinusitis     hx chronic sinusitis and nasal polyps    Class 1 obesity due to excess calories with serious comorbidity and body mass index (BMI) of 30.0 to 30.9 in adult     Cluster headaches     resolved as he got older    Essential hypertension     Frequent falls     Glaucoma     H/O oral aphthous ulcers     Recurrent apthous ulcers    Hemorrhoids     hx of    History of poliomyelitis     Hyperlipidemia     meds    Hypertension      meds    Insomnia     Megaloblastic anemia     hx of    Mild episode of recurrent major depressive disorder     Mixed hyperlipidemia     Neuropathy     Obstructive sleep apnea syndrome     CPAP, not successful using so far    Polio     hx polio    PUD (peptic ulcer disease)     hx PUD with H pylori - s/p antibiotic tx for H pylori    Shoulder arthritis        Past Surgical History: No past surgical history on file.    Family History:   Family History   Problem Relation Age of Onset    Asthma Mother     Hypertension Father        Social History:   Social History     Socioeconomic History    Marital status:    Tobacco Use    Smoking status: Never    Smokeless tobacco: Never    Tobacco comments:     Tobacco/Vaping use   Vaping Use    Vaping status: Never Used   Substance and Sexual Activity    Alcohol use: Not Currently    Drug use: Defer    Sexual activity: Defer       Medications:     Current Outpatient Medications:     amLODIPine (NORVASC) 5 MG tablet, Take 1 tablet by mouth Daily., Disp: 90 tablet, Rfl: 3    bumetanide (BUMEX) 1 MG tablet, TAKE 1 TABLET BY MOUTH DAILY, Disp: 90 tablet, Rfl: 0    buPROPion XL (Wellbutrin XL) 150 MG 24 hr tablet, Take 1 tablet by mouth Daily., Disp: 60 tablet, Rfl: 5    gabapentin (NEURONTIN) 100 MG capsule, TAKE 1 CAPSULE BY MOUTH 3 TIMES A DAY, Disp: 90 capsule, Rfl: 1    losartan (COZAAR) 100 MG tablet, Take 1 tablet by mouth Daily., Disp: 90 tablet, Rfl: 3    metoprolol tartrate (LOPRESSOR) 25 MG tablet, Take 1 tablet by mouth 2 (Two) Times a Day., Disp: 180 tablet, Rfl: 1    nabumetone (RELAFEN) 750 MG tablet, TAKE 1 TABLET BY MOUTH 2 TIMES A DAY, Disp: 30 tablet, Rfl: 2    oxybutynin XL (DITROPAN-XL) 10 MG 24 hr tablet, TAKE 1 TABLET BY MOUTH DAILY, Disp: 90 tablet, Rfl: 1    pantoprazole (PROTONIX) 40 MG EC tablet, Take 1 tablet by mouth 2 (Two) Times a Day., Disp: 180 tablet, Rfl: 1    potassium chloride ER (K-TAB) 20 MEQ tablet controlled-release ER tablet, Take 1  "tablet by mouth Daily. with food., Disp: 90 tablet, Rfl: 1    predniSONE (DELTASONE) 20 MG tablet, 2 PO DAILY FOR 5 DAYS., Disp: 10 tablet, Rfl: 0    rosuvastatin (CRESTOR) 10 MG tablet, TAKE 1 TABLET BY MOUTH DAILY, Disp: 90 tablet, Rfl: 0    tamsulosin (FLOMAX) 0.4 MG capsule 24 hr capsule, Take 1 capsule by mouth Daily., Disp: 90 capsule, Rfl: 1    Allergies:   No Known Allergies    Objective     Physical Exam:  Vital Signs:   Vitals:    05/09/25 1358   BP: 130/80   Pulse: 72   SpO2: 98%   Weight: 95.3 kg (210 lb)   Height: 167.6 cm (66\")     Body mass index is 33.89 kg/m².     Physical Exam  Vitals and nursing note reviewed.   Constitutional:       General: He is not in acute distress.     Appearance: Normal appearance. He is not ill-appearing.   HENT:      Head: Normocephalic and atraumatic.      Right Ear: Tympanic membrane and ear canal normal.      Left Ear: Tympanic membrane and ear canal normal.      Nose: Nose normal.   Cardiovascular:      Rate and Rhythm: Normal rate and regular rhythm.      Heart sounds: Normal heart sounds.   Pulmonary:      Effort: Pulmonary effort is normal.      Breath sounds: Normal breath sounds.   Musculoskeletal:      Comments: Decreased range of motion right shoulder.  Pain with range of motion.   Neurological:      Mental Status: He is alert and oriented to person, place, and time. Mental status is at baseline.   Psychiatric:         Mood and Affect: Mood normal.       Physical Exam  Musculoskeletal: Tenderness noted in the shoulder with limited range of motion.    Arthrocentesis    Date/Time: 5/10/2025 7:57 PM    Performed by: Carroll Malone MD  Authorized by: Carroll Malone MD  Indications: pain   Body area: shoulder  Joint: right shoulder  Local anesthesia used: yes  Anesthesia: local infiltration    Anesthesia:  Local anesthesia used: yes  Local Anesthetic: lidocaine 1% without epinephrine  Anesthetic total: 3 mL    Sedation:  Patient sedated: no    Preparation: " Patient was prepped and draped in the usual sterile fashion.  Needle size: 22 G  Ultrasound guidance: no  Approach: anterior  Meds administered: 40 mg triamcinolone acetonide 40 MG/ML  Patient tolerance: patient tolerated the procedure well with no immediate complications          Results  Labs   - Blood counts: Stable   - Cholesterol levels: Stable  Assessment / Plan      Assessment/Plan:   Diagnoses and all orders for this visit:    1. Chronic right shoulder pain (Primary)       Assessment & Plan  1. Shoulder pain.  - Reports shoulder pain persisting for about a month, with no improvement from previous steroid pills.  - Pain worsens with movement and when lying down.  - Injection will be administered today to provide relief.  - Advised to follow up with Arash with physical therapy for further evaluation and management.    PROCEDURE  Procedure: Shoulder Injection, Right    All questions were answered and agreement to proceed was given after the following Pre-Procedure details were reviewed:  - Risks and Benefits: Potential for pain relief, risk of infection, bleeding, and temporary increase in pain.  - Alternative Options: Physical therapy, oral medications, and surgical intervention.  - Side effects: Temporary pain at the injection site, possible bruising, and infection.  - Consent: Verbal consent obtained.    Intra-Procedure:  - Time-Out: Confirmed right shoulder for injection.  - Site Preparation: Area cleaned with antiseptic solution.  - Anesthesia: Cold spray applied to minimize discomfort.  - Medication: Corticosteroid injection administered.  - Dressing: Band-aid applied to the injection site.    Post-Procedure:  - Tolerance Level: Tolerated well.  - Home Care Instructions: Avoid heavy lifting or strenuous activity with the right shoulder for the next 24 hours, monitor for signs of infection, and follow up if symptoms persist or worsen.        Follow Up:   No follow-ups on file.    Carroll Malone  [facility] [place]     Patient or patient representative verbalized consent for the use of Ambient Listening during the visit with  Carroll Malone MD for chart documentation. 5/10/2025  19:56 EDT

## 2025-06-09 RX ORDER — BUMETANIDE 1 MG/1
1 TABLET ORAL DAILY
Qty: 90 TABLET | Refills: 0 | Status: SHIPPED | OUTPATIENT
Start: 2025-06-09

## 2025-06-17 RX ORDER — PANTOPRAZOLE SODIUM 40 MG/1
40 TABLET, DELAYED RELEASE ORAL 2 TIMES DAILY
Qty: 180 TABLET | Refills: 1 | Status: SHIPPED | OUTPATIENT
Start: 2025-06-17

## 2025-07-17 ENCOUNTER — TELEPHONE (OUTPATIENT)
Dept: CARDIOLOGY | Facility: CLINIC | Age: 85
End: 2025-07-17
Payer: MEDICARE

## 2025-07-17 RX ORDER — POTASSIUM CHLORIDE 1500 MG/1
20 TABLET, EXTENDED RELEASE ORAL DAILY
Qty: 30 TABLET | Refills: 2 | Status: SHIPPED | OUTPATIENT
Start: 2025-07-17

## 2025-07-17 NOTE — TELEPHONE ENCOUNTER
PT IS IN [facility] - PT CAN'T TAKE CARE OF HIMSELF - FOR REHAB (TRANSFERRED HERE FROM [facility])     PT FELL AND PT HAS A LOT OF SWELLING    PT HAD ISSUES WITH KIDNEYS - VERY DEHYDRATED - MAJOR MUSCLE BREAKDOWN THAT AFFECTED KIDNEYS     HAD RECEIVED REFILL REQUEST FOR AMLODIPINE AND WANTED TO NOTE PTS CONDITION

## 2025-07-18 NOTE — TELEPHONE ENCOUNTER
Jena Harris, on the patients verbal states that they will call to establish when they know a discharge date.

## 2025-07-21 RX ORDER — ROSUVASTATIN CALCIUM 10 MG/1
10 TABLET, COATED ORAL DAILY
Qty: 30 TABLET | Refills: 2 | Status: SHIPPED | OUTPATIENT
Start: 2025-07-21

## 2025-08-11 RX ORDER — TAMSULOSIN HYDROCHLORIDE 0.4 MG/1
1 CAPSULE ORAL DAILY
Qty: 90 CAPSULE | Refills: 0 | Status: SHIPPED | OUTPATIENT
Start: 2025-08-11